# Patient Record
Sex: MALE | Race: ASIAN | Employment: FULL TIME | URBAN - METROPOLITAN AREA
[De-identification: names, ages, dates, MRNs, and addresses within clinical notes are randomized per-mention and may not be internally consistent; named-entity substitution may affect disease eponyms.]

---

## 2017-02-25 ENCOUNTER — HOSPITAL ENCOUNTER (EMERGENCY)
Facility: HOSPITAL | Age: 77
Discharge: HOME | End: 2017-02-25
Attending: EMERGENCY MEDICINE
Payer: MEDICARE

## 2017-02-25 ENCOUNTER — APPOINTMENT (OUTPATIENT)
Dept: CT IMAGING | Facility: HOSPITAL | Age: 77
End: 2017-02-25
Attending: EMERGENCY MEDICINE
Payer: MEDICARE

## 2017-02-25 VITALS
RESPIRATION RATE: 16 BRPM | OXYGEN SATURATION: 98 % | HEIGHT: 64 IN | WEIGHT: 148 LBS | DIASTOLIC BLOOD PRESSURE: 63 MMHG | SYSTOLIC BLOOD PRESSURE: 133 MMHG | TEMPERATURE: 97 F | HEART RATE: 81 BPM | BODY MASS INDEX: 25.27 KG/M2

## 2017-02-25 DIAGNOSIS — R42 VERTIGO: Primary | ICD-10-CM

## 2017-02-25 LAB
ATRIAL RATE: 70 BPM
P AXIS: 38 DEGREES
P-R INTERVAL: 138 MS
Q-T INTERVAL: 414 MS
QRS DURATION: 82 MS
QTC CALCULATION (BEZET): 447 MS
R AXIS: 15 DEGREES
T AXIS: 47 DEGREES
VENTRICULAR RATE: 70 BPM

## 2017-02-25 PROCEDURE — 93005 ELECTROCARDIOGRAM TRACING: CPT

## 2017-02-25 PROCEDURE — 99284 EMERGENCY DEPT VISIT MOD MDM: CPT

## 2017-02-25 PROCEDURE — 70450 CT HEAD/BRAIN W/O DYE: CPT

## 2017-02-25 PROCEDURE — 93010 ELECTROCARDIOGRAM REPORT: CPT

## 2017-02-25 RX ORDER — MECLIZINE HYDROCHLORIDE 25 MG/1
25 TABLET ORAL 3 TIMES DAILY PRN
Qty: 20 TABLET | Refills: 0 | Status: SHIPPED | OUTPATIENT
Start: 2017-02-25 | End: 2017-03-21

## 2017-02-25 RX ORDER — ONDANSETRON 4 MG/1
4 TABLET, ORALLY DISINTEGRATING ORAL ONCE
Status: COMPLETED | OUTPATIENT
Start: 2017-02-25 | End: 2017-02-25

## 2017-02-25 RX ORDER — GLIPIZIDE 10 MG/1
20 TABLET ORAL DAILY
COMMUNITY
End: 2017-09-18

## 2017-02-25 RX ORDER — MECLIZINE HYDROCHLORIDE 25 MG/1
25 TABLET ORAL ONCE
Status: COMPLETED | OUTPATIENT
Start: 2017-02-25 | End: 2017-02-25

## 2017-02-25 RX ORDER — ONDANSETRON 4 MG/1
4 TABLET, ORALLY DISINTEGRATING ORAL EVERY 4 HOURS PRN
Qty: 10 TABLET | Refills: 0 | Status: SHIPPED | OUTPATIENT
Start: 2017-02-25 | End: 2017-03-04

## 2017-02-25 NOTE — ED PROVIDER NOTES
Patient Seen in: [facility] Emergency Department    History   Patient presents with:  Dizziness (neurologic)    Stated Complaint: dizziness     HPI    81-year-old male with a history of insulin-dependent diabetes, hypertension, presents to the emerg Smokeless Status: Never Used                        Alcohol Use: No                Review of Systems    Positive for stated complaint: dizziness   Other systems are as noted in HPI. Constitutional and vital signs reviewed.       All other systems reviewed Ventricular rate of 70. No acute ST elevations or depressions. Rate, axis, intervals are noted. Agree with computer interpretation. Patient was brought back to the examination room immediately.   The patient was then placed on a cardiac monitor Zofran. Follow-up with his primary care physician. The family states he has been seen by ENT in the past for similar complaints and also for his tinnitus. Findings  of the patient's tests results were discussed with the patient in detail.   They were u

## 2017-03-07 ENCOUNTER — NURSE ONLY (OUTPATIENT)
Dept: ENDOCRINOLOGY CLINIC | Facility: CLINIC | Age: 77
End: 2017-03-07

## 2017-03-07 VITALS
DIASTOLIC BLOOD PRESSURE: 72 MMHG | HEART RATE: 76 BPM | SYSTOLIC BLOOD PRESSURE: 146 MMHG | WEIGHT: 148 LBS | BODY MASS INDEX: 25 KG/M2

## 2017-03-07 DIAGNOSIS — E11.65 TYPE 2 DIABETES MELLITUS WITH HYPERGLYCEMIA, WITHOUT LONG-TERM CURRENT USE OF INSULIN (HCC): Primary | ICD-10-CM

## 2017-03-07 PROCEDURE — G0108 DIAB MANAGE TRN  PER INDIV: HCPCS

## 2017-03-08 NOTE — PROGRESS NOTES
Cherri Prado  : 1940 was seen for Injection Instruction:    Date: 3/7/2017       /72 mmHg  Pulse 76  Wt 148 lb    Glucose today: 221  Teachings were made to PT.  Wife and Daughter    Medication type, dose and frequency: Levemir 20 units a

## 2017-03-21 ENCOUNTER — NURSE ONLY (OUTPATIENT)
Dept: ENDOCRINOLOGY CLINIC | Facility: CLINIC | Age: 77
End: 2017-03-21

## 2017-03-21 VITALS
DIASTOLIC BLOOD PRESSURE: 69 MMHG | WEIGHT: 157 LBS | BODY MASS INDEX: 27 KG/M2 | HEART RATE: 77 BPM | SYSTOLIC BLOOD PRESSURE: 127 MMHG

## 2017-03-21 DIAGNOSIS — E11.65 TYPE 2 DIABETES MELLITUS WITH HYPERGLYCEMIA, WITHOUT LONG-TERM CURRENT USE OF INSULIN (HCC): Primary | ICD-10-CM

## 2017-03-21 PROCEDURE — 99211 OFF/OP EST MAY X REQ PHY/QHP: CPT

## 2017-03-21 RX ORDER — INSULIN ASPART 100 [IU]/ML
5 INJECTION, SOLUTION INTRAVENOUS; SUBCUTANEOUS
COMMUNITY
End: 2017-12-11

## 2017-03-21 NOTE — PROGRESS NOTES
Met with Fuad Nolen for a 2 week follow up post insulin initiation. He is testing BG levels fasting and before lunch and dinner and at HS. Fasting BG levels are within acceptable ranges ranging from  with an average of 96.   Before lunch he is within ac

## 2017-06-16 ENCOUNTER — APPOINTMENT (OUTPATIENT)
Dept: CT IMAGING | Facility: HOSPITAL | Age: 77
End: 2017-06-16
Attending: EMERGENCY MEDICINE
Payer: MEDICARE

## 2017-06-16 ENCOUNTER — HOSPITAL ENCOUNTER (EMERGENCY)
Facility: HOSPITAL | Age: 77
Discharge: HOME | End: 2017-06-16
Attending: EMERGENCY MEDICINE
Payer: MEDICARE

## 2017-06-16 VITALS
OXYGEN SATURATION: 98 % | HEART RATE: 98 BPM | DIASTOLIC BLOOD PRESSURE: 71 MMHG | BODY MASS INDEX: 26.98 KG/M2 | WEIGHT: 158 LBS | SYSTOLIC BLOOD PRESSURE: 136 MMHG | TEMPERATURE: 98 F | HEIGHT: 64 IN | RESPIRATION RATE: 16 BRPM

## 2017-06-16 DIAGNOSIS — K57.92 ACUTE DIVERTICULITIS: Primary | ICD-10-CM

## 2017-06-16 DIAGNOSIS — E16.2 HYPOGLYCEMIA: ICD-10-CM

## 2017-06-16 PROCEDURE — 85025 COMPLETE CBC W/AUTO DIFF WBC: CPT | Performed by: EMERGENCY MEDICINE

## 2017-06-16 PROCEDURE — 93010 ELECTROCARDIOGRAM REPORT: CPT

## 2017-06-16 PROCEDURE — 99285 EMERGENCY DEPT VISIT HI MDM: CPT

## 2017-06-16 PROCEDURE — 96375 TX/PRO/DX INJ NEW DRUG ADDON: CPT

## 2017-06-16 PROCEDURE — 93005 ELECTROCARDIOGRAM TRACING: CPT

## 2017-06-16 PROCEDURE — 83690 ASSAY OF LIPASE: CPT | Performed by: EMERGENCY MEDICINE

## 2017-06-16 PROCEDURE — 80053 COMPREHEN METABOLIC PANEL: CPT | Performed by: EMERGENCY MEDICINE

## 2017-06-16 PROCEDURE — 74177 CT ABD & PELVIS W/CONTRAST: CPT | Performed by: EMERGENCY MEDICINE

## 2017-06-16 PROCEDURE — 96374 THER/PROPH/DIAG INJ IV PUSH: CPT

## 2017-06-16 PROCEDURE — 96361 HYDRATE IV INFUSION ADD-ON: CPT

## 2017-06-16 PROCEDURE — 82962 GLUCOSE BLOOD TEST: CPT

## 2017-06-16 RX ORDER — ATORVASTATIN CALCIUM 20 MG/1
20 TABLET, FILM COATED ORAL DAILY
COMMUNITY
End: 2018-07-09

## 2017-06-16 RX ORDER — DEXTROSE MONOHYDRATE 25 G/50ML
50 INJECTION, SOLUTION INTRAVENOUS ONCE
Status: COMPLETED | OUTPATIENT
Start: 2017-06-16 | End: 2017-06-16

## 2017-06-16 RX ORDER — SODIUM CHLORIDE 9 MG/ML
INJECTION, SOLUTION INTRAVENOUS ONCE
Status: COMPLETED | OUTPATIENT
Start: 2017-06-16 | End: 2017-06-16

## 2017-06-16 RX ORDER — ONDANSETRON 2 MG/ML
4 INJECTION INTRAMUSCULAR; INTRAVENOUS ONCE
Status: COMPLETED | OUTPATIENT
Start: 2017-06-16 | End: 2017-06-16

## 2017-06-16 RX ORDER — LOSARTAN POTASSIUM 50 MG/1
50 TABLET ORAL DAILY
COMMUNITY
End: 2018-07-09

## 2017-06-16 RX ORDER — LEVOFLOXACIN 500 MG/1
500 TABLET, FILM COATED ORAL DAILY
Qty: 10 TABLET | Refills: 0 | Status: SHIPPED | OUTPATIENT
Start: 2017-06-16 | End: 2017-06-26

## 2017-06-16 RX ORDER — METRONIDAZOLE 500 MG/1
500 TABLET ORAL 3 TIMES DAILY
Qty: 30 TABLET | Refills: 0 | Status: SHIPPED | OUTPATIENT
Start: 2017-06-16 | End: 2017-06-26

## 2017-06-16 RX ORDER — KETOROLAC TROMETHAMINE 30 MG/ML
30 INJECTION, SOLUTION INTRAMUSCULAR; INTRAVENOUS ONCE
Status: COMPLETED | OUTPATIENT
Start: 2017-06-16 | End: 2017-06-16

## 2017-06-16 NOTE — ED INITIAL ASSESSMENT (HPI)
Pt here with lower abdominal pain since yesterday, no fevers, vomiting or diarrhea.  Last BM this am.

## 2017-06-16 NOTE — ED PROVIDER NOTES
Patient Seen in: [facility] Emergency Department    History   Patient presents with:  Abdomen/Flank Pain (GI/)    Stated Complaint: lower abdominal pain- hx of diverticulitis    HPI    29-year-old male comes in the hospital she completed having di (LIPITOR) 10 MG Oral Tab,  1 TABLET DAILY       Family History   Problem Relation Age of Onset   • Other[other] [OTHER] Sister      BRAIN HEMMORRAGE         Smoking Status: Never Smoker                      Smokeless Status: Never Used Prelim 7.52 (*)     Neutrophil Absolute 7.52 (*)     Monocyte Absolute 1.09 (*)     All other components within normal limits   LIPASE - Normal   CBC WITH DIFFERENTIAL WITH PLATELET    Narrative:      The following orders were created for panel order CBC WI coronal MPR imaging was performed.  Dose reduction techniques were used.  Dose information is   transmitted to the [facility] ([facility]) [address]) which includes the Dose Index Registry.     PATIENT STATED HISTORY associated with a thick calcified pleural plaque along the diaphragmatic pleura on the right. Correlate for prior dust exposure/asbestos related disease.   OTHER:  Negative.       Medications   0.9%  NaCl infusion ( Intravenous New Bag 6/16/17 0570)   ondan

## 2017-06-29 ENCOUNTER — NURSE ONLY (OUTPATIENT)
Dept: ENDOCRINOLOGY CLINIC | Facility: CLINIC | Age: 77
End: 2017-06-29

## 2017-06-29 VITALS
DIASTOLIC BLOOD PRESSURE: 74 MMHG | HEART RATE: 82 BPM | SYSTOLIC BLOOD PRESSURE: 128 MMHG | WEIGHT: 158 LBS | BODY MASS INDEX: 27 KG/M2

## 2017-06-29 DIAGNOSIS — E11.65 TYPE 2 DIABETES MELLITUS WITH HYPERGLYCEMIA, WITHOUT LONG-TERM CURRENT USE OF INSULIN (HCC): Primary | ICD-10-CM

## 2017-06-29 PROCEDURE — G0108 DIAB MANAGE TRN  PER INDIV: HCPCS

## 2017-06-29 NOTE — PROGRESS NOTES
Ade Mccain  : 1940 was seen for Diabetic Education Follow up:    Date: 2017       Assessment:     Assessment: /74   Pulse 82   Wt 158 lb   BMI 27.12 kg/m²      No results found for: A1C     Depression Screen -  0    Diagnosis: Typ 142 136 - 144 mmol/L   Potassium 3.7 3.6 - 5.1 mmol/L   Chloride 109 101 - 111 mmol/L   CO2 28.0 22.0 - 32.0 mmol/L   -LIPASE   Result Value Ref Range   Lipase 190 73 - 393 U/L   -EKG 12-LEAD   Result Value Ref Range   Ventricular rate 74 BPM   Atrial rate low saturated fat, high fiber, reduced sodium)    Being Active  Benefits and effect of activity on BG discussed. Reviewed when to call MD and benefits of goal setting.       Monitoring  Reinforced glucose monitoring schedules: 4 times a day before meals an

## 2017-08-31 ENCOUNTER — HOSPITAL ENCOUNTER (OUTPATIENT)
Facility: HOSPITAL | Age: 77
Setting detail: OBSERVATION
Discharge: HOME | End: 2017-09-01
Attending: EMERGENCY MEDICINE | Admitting: INTERNAL MEDICINE
Payer: MEDICARE

## 2017-08-31 ENCOUNTER — ANESTHESIA EVENT (OUTPATIENT)
Dept: ENDOSCOPY | Facility: HOSPITAL | Age: 77
End: 2017-08-31
Payer: MEDICARE

## 2017-08-31 DIAGNOSIS — K92.1 BLOODY STOOLS: ICD-10-CM

## 2017-08-31 DIAGNOSIS — K92.2 GASTROINTESTINAL HEMORRHAGE, UNSPECIFIED GASTROINTESTINAL HEMORRHAGE TYPE: Primary | ICD-10-CM

## 2017-08-31 LAB
ALBUMIN SERPL-MCNC: 3.4 G/DL (ref 3.5–4.8)
ALP LIVER SERPL-CCNC: 80 U/L (ref 45–117)
ALT SERPL-CCNC: 38 U/L (ref 17–63)
ANTIBODY SCREEN: NEGATIVE
APTT PPP: 32 SECONDS (ref 25–34)
AST SERPL-CCNC: 23 U/L (ref 15–41)
ATRIAL RATE: 75 BPM
BASOPHILS # BLD AUTO: 0.05 X10(3) UL (ref 0–0.1)
BASOPHILS NFR BLD AUTO: 0.7 %
BILIRUB SERPL-MCNC: 0.6 MG/DL (ref 0.1–2)
BUN BLD-MCNC: 21 MG/DL (ref 8–20)
CALCIUM BLD-MCNC: 9.1 MG/DL (ref 8.3–10.3)
CHLORIDE: 109 MMOL/L (ref 101–111)
CO2: 24 MMOL/L (ref 22–32)
CREAT BLD-MCNC: 1.43 MG/DL (ref 0.7–1.3)
EOSINOPHIL # BLD AUTO: 0.17 X10(3) UL (ref 0–0.3)
EOSINOPHIL NFR BLD AUTO: 2.5 %
ERYTHROCYTE [DISTWIDTH] IN BLOOD BY AUTOMATED COUNT: 12.6 % (ref 11.5–16)
GLUCOSE BLD-MCNC: 110 MG/DL (ref 65–99)
GLUCOSE BLD-MCNC: 205 MG/DL (ref 70–99)
GLUCOSE BLD-MCNC: 77 MG/DL (ref 65–99)
GLUCOSE BLD-MCNC: 93 MG/DL (ref 65–99)
HCT VFR BLD AUTO: 44.6 % (ref 37–53)
HGB BLD-MCNC: 14.5 G/DL (ref 13–17)
IMMATURE GRANULOCYTE COUNT: 0.01 X10(3) UL (ref 0–1)
IMMATURE GRANULOCYTE RATIO %: 0.1 %
INR BLD: 1.06 (ref 0.89–1.11)
LYMPHOCYTES # BLD AUTO: 2.04 X10(3) UL (ref 0.9–4)
LYMPHOCYTES NFR BLD AUTO: 29.7 %
M PROTEIN MFR SERPL ELPH: 7.2 G/DL (ref 6.1–8.3)
MCH RBC QN AUTO: 30.1 PG (ref 27–33.2)
MCHC RBC AUTO-ENTMCNC: 32.5 G/DL (ref 31–37)
MCV RBC AUTO: 92.5 FL (ref 80–99)
MONOCYTES # BLD AUTO: 0.49 X10(3) UL (ref 0.1–0.6)
MONOCYTES NFR BLD AUTO: 7.1 %
NEUTROPHIL ABS PRELIM: 4.11 X10 (3) UL (ref 1.3–6.7)
NEUTROPHILS # BLD AUTO: 4.11 X10(3) UL (ref 1.3–6.7)
NEUTROPHILS NFR BLD AUTO: 59.9 %
P AXIS: 33 DEGREES
P-R INTERVAL: 152 MS
PLATELET # BLD AUTO: 290 10(3)UL (ref 150–450)
POTASSIUM SERPL-SCNC: 4.3 MMOL/L (ref 3.6–5.1)
PSA SERPL DL<=0.01 NG/ML-MCNC: 13.8 SECONDS (ref 12–14.3)
Q-T INTERVAL: 372 MS
QRS DURATION: 82 MS
QTC CALCULATION (BEZET): 415 MS
R AXIS: 11 DEGREES
RBC # BLD AUTO: 4.82 X10(6)UL (ref 3.8–5.8)
RED CELL DISTRIBUTION WIDTH-SD: 42.9 FL (ref 35.1–46.3)
RH BLOOD TYPE: POSITIVE
SODIUM SERPL-SCNC: 140 MMOL/L (ref 136–144)
T AXIS: 46 DEGREES
VENTRICULAR RATE: 75 BPM
WBC # BLD AUTO: 6.9 X10(3) UL (ref 4–13)

## 2017-08-31 PROCEDURE — 85730 THROMBOPLASTIN TIME PARTIAL: CPT | Performed by: EMERGENCY MEDICINE

## 2017-08-31 PROCEDURE — 99285 EMERGENCY DEPT VISIT HI MDM: CPT

## 2017-08-31 PROCEDURE — 96360 HYDRATION IV INFUSION INIT: CPT

## 2017-08-31 PROCEDURE — 80053 COMPREHEN METABOLIC PANEL: CPT | Performed by: EMERGENCY MEDICINE

## 2017-08-31 PROCEDURE — 82962 GLUCOSE BLOOD TEST: CPT

## 2017-08-31 PROCEDURE — 86900 BLOOD TYPING SEROLOGIC ABO: CPT | Performed by: EMERGENCY MEDICINE

## 2017-08-31 PROCEDURE — 86901 BLOOD TYPING SEROLOGIC RH(D): CPT | Performed by: EMERGENCY MEDICINE

## 2017-08-31 PROCEDURE — 85025 COMPLETE CBC W/AUTO DIFF WBC: CPT | Performed by: EMERGENCY MEDICINE

## 2017-08-31 PROCEDURE — 86850 RBC ANTIBODY SCREEN: CPT | Performed by: EMERGENCY MEDICINE

## 2017-08-31 PROCEDURE — 93005 ELECTROCARDIOGRAM TRACING: CPT

## 2017-08-31 PROCEDURE — 93010 ELECTROCARDIOGRAM REPORT: CPT

## 2017-08-31 PROCEDURE — 85610 PROTHROMBIN TIME: CPT | Performed by: EMERGENCY MEDICINE

## 2017-08-31 RX ORDER — LOSARTAN POTASSIUM 50 MG/1
50 TABLET ORAL DAILY
Status: DISCONTINUED | OUTPATIENT
Start: 2017-08-31 | End: 2017-09-01

## 2017-08-31 RX ORDER — SODIUM CHLORIDE 9 MG/ML
1000 INJECTION, SOLUTION INTRAVENOUS ONCE
Status: DISCONTINUED | OUTPATIENT
Start: 2017-08-31 | End: 2017-08-31

## 2017-08-31 RX ORDER — SODIUM CHLORIDE 9 MG/ML
1000 INJECTION, SOLUTION INTRAVENOUS ONCE
Status: COMPLETED | OUTPATIENT
Start: 2017-08-31 | End: 2017-08-31

## 2017-08-31 RX ORDER — SODIUM CHLORIDE 450 MG/100ML
INJECTION, SOLUTION INTRAVENOUS CONTINUOUS
Status: DISCONTINUED | OUTPATIENT
Start: 2017-08-31 | End: 2017-09-01

## 2017-08-31 RX ORDER — SODIUM CHLORIDE 9 MG/ML
INJECTION, SOLUTION INTRAVENOUS CONTINUOUS
Status: ACTIVE | OUTPATIENT
Start: 2017-08-31 | End: 2017-08-31

## 2017-08-31 RX ORDER — AMLODIPINE BESYLATE 2.5 MG/1
2.5 TABLET ORAL
Status: DISCONTINUED | OUTPATIENT
Start: 2017-08-31 | End: 2017-09-01

## 2017-08-31 RX ORDER — ACETAMINOPHEN 160 MG
2000 TABLET,DISINTEGRATING ORAL DAILY
COMMUNITY

## 2017-08-31 RX ORDER — DEXTROSE MONOHYDRATE 25 G/50ML
50 INJECTION, SOLUTION INTRAVENOUS
Status: DISCONTINUED | OUTPATIENT
Start: 2017-08-31 | End: 2017-09-01

## 2017-08-31 RX ORDER — GARLIC EXTRACT 500 MG
1 CAPSULE ORAL DAILY
COMMUNITY
End: 2018-02-23 | Stop reason: ALTCHOICE

## 2017-08-31 NOTE — ED PROVIDER NOTES
Patient Seen in: [facility] Emergency Department    History   Patient presents with:  GI Bleeding (gastrointestinal)    Stated Complaint: GI BLEED    HPI    60-year-old male comes in the hospital with a complaint of having bright red blood in his st complaint: GI BLEED  Other systems are as noted in HPI. Constitutional and vital signs reviewed. All other systems reviewed and negative except as noted above. PSFH elements reviewed from today and agreed except as otherwise stated in HPI.     Phys ---------                               -----------         ------                     CBC W/ DIFFERENTIAL[[id number]]          Normal              Final result                 Please view results for these tests on the individual orders.    TYPE AND SCREE

## 2017-08-31 NOTE — ANESTHESIA PREPROCEDURE EVALUATION
PRE-OP EVALUATION    Patient Name: Sb Cheng    Pre-op Diagnosis: Bloody stools [K92.1]    Procedure(s):  COLONOSCOPY    Surgeon(s) and Role:     * Madi Gonzalez,  - Eboni    Pre-op vitals reviewed.   Temp: 97.7 °F (36.5 °C)  Pulse: 58  Resp OTHER SURGICAL HISTORY      Comment: SPAM/HAD AGIOGRAM & RELAPSED     Smoking status: Never Smoker    Smokeless tobacco: Never Used    Alcohol use No       Drug use: No     Available pre-op labs reviewed.     Lab Results  Component Value Date   WBC 6.9 08/3

## 2017-08-31 NOTE — H&P
[address]    PATIENT'S NAME: Jessy Pena   ATTENDING PHYSICIAN: Abdoulaye Dunn M.D.    PATIENT ACCOUNT#:   [de-identified]    LOCATION:  [address] [place] [address]  MEDICAL RECORD #:   [id number]       YOB: 1940  ADMISSION DATE:       08/31/2017

## 2017-08-31 NOTE — ED INITIAL ASSESSMENT (HPI)
Pt complains of blood in stool yesterday and today. It is bright red. It occurred twice yesterday and once today with bowel movements. Pt is not on anticoagulants.  He has been diagnosed with hemorrhoids in the past.

## 2017-08-31 NOTE — CONSULTS
[facility]                       Gastroenterology [address] [facility] Gastroenterology    [facility]:  [facility]    1940 MRN [id number]   Location [address] [facility] [address] Attending Mimi Collado, [address] [facility] facility-administered medications on file as of 8/31/2017. Allergies: No Known Allergies  SocHx:  No history of smoking; The patient denies alcohol intake; The patient has no history of IV drug use or other illicit substances.   FamHx: The patient has no S1 and S2  Resp: Clear to auscultation bilaterally without wheezes; rubs, rhonchi, or rales  Abdomen: Soft, non-tender, non-distended with the presence of bowel sounds; No hepatosplenomegaly; no rebound or guarding;  No ascites is clinically apparent; no ty Zofran 4 mg IV q 6 PRN nausea  Thank you for the consultation, we will follow the patient with you.   AUBREE Loo  10:19 AM  8/31/2017  [facility] Gastroenterology  [phone]    Attending physician addendum:   I personally saw and examined the natalie

## 2017-09-01 ENCOUNTER — ANESTHESIA (OUTPATIENT)
Dept: ENDOSCOPY | Facility: HOSPITAL | Age: 77
End: 2017-09-01
Payer: MEDICARE

## 2017-09-01 ENCOUNTER — SURGERY (OUTPATIENT)
Age: 77
End: 2017-09-01

## 2017-09-01 VITALS
OXYGEN SATURATION: 97 % | HEART RATE: 65 BPM | TEMPERATURE: 98 F | RESPIRATION RATE: 18 BRPM | HEIGHT: 64 IN | BODY MASS INDEX: 26.98 KG/M2 | DIASTOLIC BLOOD PRESSURE: 56 MMHG | WEIGHT: 158 LBS | SYSTOLIC BLOOD PRESSURE: 139 MMHG

## 2017-09-01 LAB
BASOPHILS # BLD AUTO: 0.07 X10(3) UL (ref 0–0.1)
BASOPHILS NFR BLD AUTO: 0.9 %
BUN BLD-MCNC: 14 MG/DL (ref 8–20)
CALCIUM BLD-MCNC: 8.8 MG/DL (ref 8.3–10.3)
CHLORIDE: 111 MMOL/L (ref 101–111)
CO2: 25 MMOL/L (ref 22–32)
CREAT BLD-MCNC: 1.2 MG/DL (ref 0.7–1.3)
EOSINOPHIL # BLD AUTO: 0.24 X10(3) UL (ref 0–0.3)
EOSINOPHIL NFR BLD AUTO: 3.2 %
ERYTHROCYTE [DISTWIDTH] IN BLOOD BY AUTOMATED COUNT: 12.5 % (ref 11.5–16)
GLUCOSE BLD-MCNC: 80 MG/DL (ref 65–99)
GLUCOSE BLD-MCNC: 81 MG/DL (ref 70–99)
HAV IGM SER QL: 2.2 MG/DL (ref 1.7–3)
HCT VFR BLD AUTO: 41.4 % (ref 37–53)
HGB BLD-MCNC: 13.9 G/DL (ref 13–17)
IMMATURE GRANULOCYTE COUNT: 0.01 X10(3) UL (ref 0–1)
IMMATURE GRANULOCYTE RATIO %: 0.1 %
LYMPHOCYTES # BLD AUTO: 2.65 X10(3) UL (ref 0.9–4)
LYMPHOCYTES NFR BLD AUTO: 35.1 %
MCH RBC QN AUTO: 31 PG (ref 27–33.2)
MCHC RBC AUTO-ENTMCNC: 33.6 G/DL (ref 31–37)
MCV RBC AUTO: 92.2 FL (ref 80–99)
MONOCYTES # BLD AUTO: 0.73 X10(3) UL (ref 0.1–0.6)
MONOCYTES NFR BLD AUTO: 9.7 %
NEUTROPHIL ABS PRELIM: 3.84 X10 (3) UL (ref 1.3–6.7)
NEUTROPHILS # BLD AUTO: 3.84 X10(3) UL (ref 1.3–6.7)
NEUTROPHILS NFR BLD AUTO: 51 %
PLATELET # BLD AUTO: 275 10(3)UL (ref 150–450)
POTASSIUM SERPL-SCNC: 4.1 MMOL/L (ref 3.6–5.1)
RBC # BLD AUTO: 4.49 X10(6)UL (ref 3.8–5.8)
RED CELL DISTRIBUTION WIDTH-SD: 42.4 FL (ref 35.1–46.3)
SODIUM SERPL-SCNC: 142 MMOL/L (ref 136–144)
WBC # BLD AUTO: 7.5 X10(3) UL (ref 4–13)

## 2017-09-01 PROCEDURE — 0DBK8ZZ EXCISION OF ASCENDING COLON, VIA NATURAL OR ARTIFICIAL OPENING ENDOSCOPIC: ICD-10-PCS | Performed by: INTERNAL MEDICINE

## 2017-09-01 PROCEDURE — 88305 TISSUE EXAM BY PATHOLOGIST: CPT | Performed by: INTERNAL MEDICINE

## 2017-09-01 PROCEDURE — 85025 COMPLETE CBC W/AUTO DIFF WBC: CPT | Performed by: NURSE PRACTITIONER

## 2017-09-01 PROCEDURE — 80048 BASIC METABOLIC PNL TOTAL CA: CPT | Performed by: NURSE PRACTITIONER

## 2017-09-01 PROCEDURE — 82962 GLUCOSE BLOOD TEST: CPT

## 2017-09-01 PROCEDURE — 83735 ASSAY OF MAGNESIUM: CPT | Performed by: NURSE PRACTITIONER

## 2017-09-01 RX ORDER — NALOXONE HYDROCHLORIDE 0.4 MG/ML
80 INJECTION, SOLUTION INTRAMUSCULAR; INTRAVENOUS; SUBCUTANEOUS AS NEEDED
Status: CANCELLED | OUTPATIENT
Start: 2017-09-01 | End: 2017-09-01

## 2017-09-01 RX ORDER — SODIUM CHLORIDE, SODIUM LACTATE, POTASSIUM CHLORIDE, CALCIUM CHLORIDE 600; 310; 30; 20 MG/100ML; MG/100ML; MG/100ML; MG/100ML
INJECTION, SOLUTION INTRAVENOUS CONTINUOUS
Status: CANCELLED | OUTPATIENT
Start: 2017-09-01

## 2017-09-01 RX ORDER — ONDANSETRON 2 MG/ML
4 INJECTION INTRAMUSCULAR; INTRAVENOUS AS NEEDED
Status: CANCELLED | OUTPATIENT
Start: 2017-09-01 | End: 2017-09-01

## 2017-09-01 RX ORDER — DIPHENHYDRAMINE HYDROCHLORIDE 50 MG/ML
12.5 INJECTION INTRAMUSCULAR; INTRAVENOUS AS NEEDED
Status: CANCELLED | OUTPATIENT
Start: 2017-09-01 | End: 2017-09-01

## 2017-09-01 RX ORDER — DEXTROSE MONOHYDRATE 25 G/50ML
50 INJECTION, SOLUTION INTRAVENOUS
Status: CANCELLED | OUTPATIENT
Start: 2017-09-01

## 2017-09-01 NOTE — H&P (VIEW-ONLY)
[facility]                       Gastroenterology [address] [facility] Gastroenterology    Leidy Hooker Patient Status:  Emergency    1940 MRN [id number]   Location [address] [facility] Attending Claire Iraheta, [address] [place] [address] facility-administered medications on file as of 8/31/2017. Allergies: No Known Allergies  SocHx:  No history of smoking; The patient denies alcohol intake; The patient has no history of IV drug use or other illicit substances.   FamHx: The patient has no S1 and S2  Resp: Clear to auscultation bilaterally without wheezes; rubs, rhonchi, or rales  Abdomen: Soft, non-tender, non-distended with the presence of bowel sounds; No hepatosplenomegaly; no rebound or guarding;  No ascites is clinically apparent; no ty Zofran 4 mg IV q 6 PRN nausea  Thank you for the consultation, we will follow the patient with you.   AUBREE Landaverde  10:19 AM  8/31/2017  [facility]  [phone]    Attending physician addendum:   I personally saw and examined the natalie

## 2017-09-01 NOTE — PLAN OF CARE
GASTROINTESTINAL - ADULT    • Maintains or returns to baseline bowel function Progressing          Admitted from ED via cart. Ambulated to bed. AAOx4. VSS. Admission database completed. C/O BRBPR. Clear liquid diet prescribed.   GoLytely prep started

## 2017-09-01 NOTE — PLAN OF CARE
Pt is alert and oriented x 4. Vitals stable. No C/o pain or discomfort. No Bleeding seen since this morning. Stable after procedure. Started on Soft diet. Paged  for Discharge orders. Will continue to monitor.

## 2017-09-01 NOTE — ANESTHESIA POSTPROCEDURE EVALUATION
[address] [facility] Patient Status:  Observation   Age/Gender 69year old male MRN [id number]   Location [address] [facility]. Attending Counts [facility] [address] Day # 0 PCP Ayanna Thorne MD       Anesthesia Post-op

## 2017-09-01 NOTE — OPERATIVE REPORT
COLONOSCOPY WITH BIOPSY      Lashanda Mcnamara Patient Status:  Observation    1940 MRN [id number]   [facility] ENDOSCOPY Attending Margi Flores [address] Day # 0 PCP Jacque Alex MD       PREOPERATIVE D colon: Three sessile 3 mm polyps s/p removal using cold biopsy forceps. Transverse colon: The mucosa and vascular pattern were normal.  Descending colon: The mucosa and vascular pattern were normal.  Sigmoid colon: Multiple diverticula. Rectum:  The muc

## 2017-09-01 NOTE — INTERVAL H&P NOTE
Pre-op Diagnosis: Bloody stools [K92.1]    The above referenced H&P was reviewed by Elton Melendrez DO on 9/1/2017, the patient was examined and no significant changes have occurred in the patient's condition since the H&P was performed.   I discussed with

## 2017-09-01 NOTE — PLAN OF CARE
Diabetes/Glucose Control    • Glucose maintained within prescribed range Progressing        GASTROINTESTINAL - ADULT    • Maintains or returns to baseline bowel function Progressing    • Minimal or absence of nausea and vomiting Progressing        PAIN - A

## 2017-09-01 NOTE — PLAN OF CARE
NURSING DISCHARGE NOTE    Discharged Home via Wheelchair. Accompanied by Spouse and Support staff  Belongings Taken by patient/family.

## 2017-10-17 PROBLEM — Z79.4 TYPE 2 DIABETES MELLITUS WITHOUT COMPLICATION, WITH LONG-TERM CURRENT USE OF INSULIN (HCC): Status: ACTIVE | Noted: 2017-10-17

## 2017-10-17 PROBLEM — E11.9 TYPE 2 DIABETES MELLITUS WITHOUT COMPLICATION, WITH LONG-TERM CURRENT USE OF INSULIN (HCC): Status: ACTIVE | Noted: 2017-10-17

## 2017-10-17 PROBLEM — E78.00 HYPERCHOLESTEREMIA: Status: ACTIVE | Noted: 2017-10-17

## 2018-02-23 PROCEDURE — 82043 UR ALBUMIN QUANTITATIVE: CPT | Performed by: INTERNAL MEDICINE

## 2018-02-23 PROCEDURE — 82570 ASSAY OF URINE CREATININE: CPT | Performed by: INTERNAL MEDICINE

## 2019-12-23 ENCOUNTER — APPOINTMENT (OUTPATIENT)
Dept: CT IMAGING | Facility: HOSPITAL | Age: 79
End: 2019-12-23
Attending: EMERGENCY MEDICINE
Payer: MEDICARE

## 2019-12-23 ENCOUNTER — APPOINTMENT (OUTPATIENT)
Dept: GENERAL RADIOLOGY | Facility: HOSPITAL | Age: 79
End: 2019-12-23
Attending: EMERGENCY MEDICINE
Payer: MEDICARE

## 2019-12-23 ENCOUNTER — HOSPITAL ENCOUNTER (EMERGENCY)
Facility: HOSPITAL | Age: 79
Discharge: HOME | End: 2019-12-23
Attending: EMERGENCY MEDICINE
Payer: MEDICARE

## 2019-12-23 VITALS
OXYGEN SATURATION: 98 % | RESPIRATION RATE: 18 BRPM | BODY MASS INDEX: 26.46 KG/M2 | HEIGHT: 64 IN | HEART RATE: 72 BPM | SYSTOLIC BLOOD PRESSURE: 138 MMHG | TEMPERATURE: 98 F | DIASTOLIC BLOOD PRESSURE: 68 MMHG | WEIGHT: 155 LBS

## 2019-12-23 DIAGNOSIS — K64.8 INTERNAL HEMORRHOIDS: ICD-10-CM

## 2019-12-23 DIAGNOSIS — K59.00 CONSTIPATION, UNSPECIFIED CONSTIPATION TYPE: Primary | ICD-10-CM

## 2019-12-23 PROCEDURE — 74018 RADEX ABDOMEN 1 VIEW: CPT | Performed by: EMERGENCY MEDICINE

## 2019-12-23 PROCEDURE — 85025 COMPLETE CBC W/AUTO DIFF WBC: CPT | Performed by: EMERGENCY MEDICINE

## 2019-12-23 PROCEDURE — 36415 COLL VENOUS BLD VENIPUNCTURE: CPT

## 2019-12-23 PROCEDURE — 74176 CT ABD & PELVIS W/O CONTRAST: CPT | Performed by: EMERGENCY MEDICINE

## 2019-12-23 PROCEDURE — 99284 EMERGENCY DEPT VISIT MOD MDM: CPT

## 2019-12-23 PROCEDURE — 82962 GLUCOSE BLOOD TEST: CPT

## 2019-12-23 RX ORDER — BISACODYL 10 MG
10 SUPPOSITORY, RECTAL RECTAL
Status: DISCONTINUED | OUTPATIENT
Start: 2019-12-23 | End: 2019-12-23

## 2019-12-23 RX ORDER — POLYETHYLENE GLYCOL 3350 17 G/17G
17 POWDER, FOR SOLUTION ORAL DAILY PRN
Qty: 12 EACH | Refills: 0 | Status: SHIPPED | OUTPATIENT
Start: 2019-12-23 | End: 2020-01-22

## 2019-12-23 NOTE — ED INITIAL ASSESSMENT (HPI)
Pt to ED with feeling constipated. Pt diagnosed with diverticulitis, started on levaquin and tramadol. Pt used suppository today, no relief. Pt c/o rectal pain.

## 2019-12-24 NOTE — ED PROVIDER NOTES
Patient Seen in: [facility] Emergency Department      History   Patient presents with:  Constipation    Stated Complaint: CONSTIPATION    HPI    32-year-old male presents to the emergency department with constipation and severe rectal pain.   Patient not in acute distress. Appearance: He is well-developed. HENT:      Head: Normocephalic and atraumatic. Neck:      Musculoskeletal: Normal range of motion and neck supple. Cardiovascular:      Rate and Rhythm: Normal rate and regular rhythm. Salima Massey MD on 12/23/2019 at 18:59          Ct Abdomen+pelvis(cpt=74176)    Result Date: 12/23/2019  CONCLUSION:  1.  The rectum is distended with stool with mild rectal wall thickening and mild induration of the perirectal fat, suggestive of proctitis

## 2020-05-14 ENCOUNTER — APPOINTMENT (OUTPATIENT)
Dept: GENERAL RADIOLOGY | Facility: HOSPITAL | Age: 80
DRG: 300 | End: 2020-05-14
Attending: EMERGENCY MEDICINE
Payer: MEDICARE

## 2020-05-14 ENCOUNTER — APPOINTMENT (OUTPATIENT)
Dept: MRI IMAGING | Facility: HOSPITAL | Age: 80
DRG: 300 | End: 2020-05-14
Attending: EMERGENCY MEDICINE
Payer: MEDICARE

## 2020-05-14 ENCOUNTER — HOSPITAL ENCOUNTER (INPATIENT)
Facility: HOSPITAL | Age: 80
LOS: 7 days | Discharge: HOME | DRG: 300 | End: 2020-05-21
Attending: EMERGENCY MEDICINE | Admitting: INTERNAL MEDICINE
Payer: MEDICARE

## 2020-05-14 ENCOUNTER — APPOINTMENT (OUTPATIENT)
Dept: ULTRASOUND IMAGING | Facility: HOSPITAL | Age: 80
DRG: 300 | End: 2020-05-14
Attending: EMERGENCY MEDICINE
Payer: MEDICARE

## 2020-05-14 ENCOUNTER — APPOINTMENT (OUTPATIENT)
Dept: CT IMAGING | Facility: HOSPITAL | Age: 80
DRG: 300 | End: 2020-05-14
Attending: EMERGENCY MEDICINE
Payer: MEDICARE

## 2020-05-14 DIAGNOSIS — J94.8 PLEURAL MASS: ICD-10-CM

## 2020-05-14 DIAGNOSIS — R50.9 FEVER, UNSPECIFIED FEVER CAUSE: Primary | ICD-10-CM

## 2020-05-14 DIAGNOSIS — D72.829 LEUKOCYTOSIS, UNSPECIFIED TYPE: ICD-10-CM

## 2020-05-14 DIAGNOSIS — M54.32 SCIATICA OF LEFT SIDE: ICD-10-CM

## 2020-05-14 PROBLEM — E11.65 TYPE 2 DIABETES MELLITUS WITH HYPERGLYCEMIA, WITHOUT LONG-TERM CURRENT USE OF INSULIN (HCC): Status: ACTIVE | Noted: 2017-10-17

## 2020-05-14 PROCEDURE — 96366 THER/PROPH/DIAG IV INF ADDON: CPT

## 2020-05-14 PROCEDURE — 81003 URINALYSIS AUTO W/O SCOPE: CPT | Performed by: EMERGENCY MEDICINE

## 2020-05-14 PROCEDURE — 85025 COMPLETE CBC W/AUTO DIFF WBC: CPT | Performed by: EMERGENCY MEDICINE

## 2020-05-14 PROCEDURE — 99285 EMERGENCY DEPT VISIT HI MDM: CPT

## 2020-05-14 PROCEDURE — 82962 GLUCOSE BLOOD TEST: CPT

## 2020-05-14 PROCEDURE — 93975 VASCULAR STUDY: CPT | Performed by: EMERGENCY MEDICINE

## 2020-05-14 PROCEDURE — 72158 MRI LUMBAR SPINE W/O & W/DYE: CPT | Performed by: EMERGENCY MEDICINE

## 2020-05-14 PROCEDURE — 74177 CT ABD & PELVIS W/CONTRAST: CPT | Performed by: EMERGENCY MEDICINE

## 2020-05-14 PROCEDURE — 71045 X-RAY EXAM CHEST 1 VIEW: CPT | Performed by: EMERGENCY MEDICINE

## 2020-05-14 PROCEDURE — A9575 INJ GADOTERATE MEGLUMI 0.1ML: HCPCS | Performed by: EMERGENCY MEDICINE

## 2020-05-14 PROCEDURE — 96365 THER/PROPH/DIAG IV INF INIT: CPT

## 2020-05-14 PROCEDURE — 80053 COMPREHEN METABOLIC PANEL: CPT | Performed by: EMERGENCY MEDICINE

## 2020-05-14 PROCEDURE — 76870 US EXAM SCROTUM: CPT | Performed by: EMERGENCY MEDICINE

## 2020-05-14 RX ORDER — MORPHINE SULFATE 4 MG/ML
4 INJECTION, SOLUTION INTRAMUSCULAR; INTRAVENOUS EVERY 2 HOUR PRN
Status: DISCONTINUED | OUTPATIENT
Start: 2020-05-14 | End: 2020-05-21

## 2020-05-14 RX ORDER — AMLODIPINE BESYLATE 2.5 MG/1
2.5 TABLET ORAL DAILY
Status: DISCONTINUED | OUTPATIENT
Start: 2020-05-15 | End: 2020-05-21

## 2020-05-14 RX ORDER — LEVOFLOXACIN 5 MG/ML
750 INJECTION, SOLUTION INTRAVENOUS ONCE
Status: COMPLETED | OUTPATIENT
Start: 2020-05-14 | End: 2020-05-14

## 2020-05-14 RX ORDER — ATORVASTATIN CALCIUM 20 MG/1
20 TABLET, FILM COATED ORAL NIGHTLY
Status: DISCONTINUED | OUTPATIENT
Start: 2020-05-14 | End: 2020-05-21

## 2020-05-14 RX ORDER — SODIUM CHLORIDE 9 MG/ML
INJECTION, SOLUTION INTRAVENOUS CONTINUOUS
Status: DISCONTINUED | OUTPATIENT
Start: 2020-05-14 | End: 2020-05-17

## 2020-05-14 RX ORDER — MORPHINE SULFATE 4 MG/ML
1 INJECTION, SOLUTION INTRAMUSCULAR; INTRAVENOUS EVERY 2 HOUR PRN
Status: DISCONTINUED | OUTPATIENT
Start: 2020-05-14 | End: 2020-05-21

## 2020-05-14 RX ORDER — DEXTROSE MONOHYDRATE 25 G/50ML
50 INJECTION, SOLUTION INTRAVENOUS
Status: DISCONTINUED | OUTPATIENT
Start: 2020-05-14 | End: 2020-05-21

## 2020-05-14 RX ORDER — ONDANSETRON 2 MG/ML
4 INJECTION INTRAMUSCULAR; INTRAVENOUS EVERY 6 HOURS PRN
Status: DISCONTINUED | OUTPATIENT
Start: 2020-05-14 | End: 2020-05-21

## 2020-05-14 RX ORDER — MORPHINE SULFATE 4 MG/ML
2 INJECTION, SOLUTION INTRAMUSCULAR; INTRAVENOUS EVERY 2 HOUR PRN
Status: DISCONTINUED | OUTPATIENT
Start: 2020-05-14 | End: 2020-05-21

## 2020-05-14 NOTE — PROGRESS NOTES
[facility] Pharmacy Note:  Renal Adjustment for levofloxacin (Winifred Lente)    Selvin Rosado is a 79year old male who has been prescribed levofloxacin (LEVAQUIN) 500 mg x1.   CrCl is estimated creatinine clearance is 30.2 mL/min (A) (based on SCr of 1.66 mg/dL (

## 2020-05-14 NOTE — ED INITIAL ASSESSMENT (HPI)
José Miguel states has bilateral sciatica pain x 6-7 days, pain to the back of both legs. José Miguel states had lab work done today and was told by his PMD to come to ER for abnormal labs.

## 2020-05-14 NOTE — ED PROVIDER NOTES
Patient Seen in: [facility] Emergency Department      History   Patient presents with:  Fever    Stated Complaint: fever, increased WBC's ~ MD Meryle Dykes wants patient admitted for possible COVID.  *    HPI    The patient is a 67-year-old male instructe 9/1/2017    Performed by Sixto Cabral DO at [facility] ENDOSCOPY   • HERNIA SURGERY     • OTHER SURGICAL HISTORY  1994    SPAM/HAD AGIOGRAM & RELAPSED                    Social History    Tobacco Use      Smoking status: Never Smoker      Smokeless tobacco: N but no overlying skin changes. Compartments are soft. Genitourinary: Normal external male genitalia on inspection, without any discharge at the meatus or lesions. No testicular or epididymal tenderness, but his testicles do seem mildly enlarged.    Extre Please view results for these tests on the individual orders.    URINALYSIS WITH CULTURE REFLEX   SCAN SLIDE   RAINBOW DRAW BLUE   RAINBOW DRAW LAVENDER   RAINBOW DRAW LIGHT GREEN   RAINBOW DRAW GOLD          Blood was obtained and peripheral IV access facet     arthropathy. There is no significant spinal canal or neural foraminal     stenosis. L1-2:  Diffuse disc bulge with endplate osteophytes and mild facet     arthropathy. There is no significant spinal canal or neural foraminal     stenosis. TECHNIQUE:  CT scanning was performed from the dome of the diaphragm to     the pubic symphysis with non-ionic intravenous contrast material. Post     contrast coronal MPR imaging was performed. Dose reduction techniques     were used.  Dose information is US SCROTUM W/ DOPPLER (CPT=93975/57335)   Final Result    PROCEDURE:  US SCROTUM W/ DOPPLER (CPT=93975/33110)         COMPARISON:  None.          INDICATIONS:  eval for torsion         TECHNIQUE:  Real time grey scale ultrasound was performed of the s TECHNIQUE:  AP chest radiograph was obtained. COMPARISON:  None. INDICATIONS:  fever, increased WBC's ~ MD Shala Arriaga wants patient admitted     for possible COVID.  Swab was completed but did not result yet         PATIENT STATED HISTORY: (As side    Disposition:  Admit  5/14/2020  6:09 pm    Follow-up:  No follow-up provider specified.         Medications Prescribed:  Current Discharge Medication List                       Present on Admission  Date Reviewed: 5/14/2020          ICD-10-CM Noted

## 2020-05-15 ENCOUNTER — APPOINTMENT (OUTPATIENT)
Dept: ULTRASOUND IMAGING | Facility: HOSPITAL | Age: 80
DRG: 300 | End: 2020-05-15
Attending: HOSPITALIST
Payer: MEDICARE

## 2020-05-15 PROCEDURE — 76937 US GUIDE VASCULAR ACCESS: CPT

## 2020-05-15 PROCEDURE — 36410 VNPNXR 3YR/> PHY/QHP DX/THER: CPT

## 2020-05-15 PROCEDURE — 82962 GLUCOSE BLOOD TEST: CPT

## 2020-05-15 PROCEDURE — 85025 COMPLETE CBC W/AUTO DIFF WBC: CPT | Performed by: HOSPITALIST

## 2020-05-15 PROCEDURE — 93970 EXTREMITY STUDY: CPT | Performed by: HOSPITALIST

## 2020-05-15 PROCEDURE — 84145 PROCALCITONIN (PCT): CPT | Performed by: INTERNAL MEDICINE

## 2020-05-15 PROCEDURE — 80053 COMPREHEN METABOLIC PANEL: CPT | Performed by: HOSPITALIST

## 2020-05-15 PROCEDURE — 87040 BLOOD CULTURE FOR BACTERIA: CPT | Performed by: INTERNAL MEDICINE

## 2020-05-15 RX ORDER — POLYETHYLENE GLYCOL 3350 17 G/17G
17 POWDER, FOR SOLUTION ORAL DAILY PRN
Status: DISCONTINUED | OUTPATIENT
Start: 2020-05-15 | End: 2020-05-21

## 2020-05-15 RX ORDER — SCOLOPAMINE TRANSDERMAL SYSTEM 1 MG/1
1 PATCH, EXTENDED RELEASE TRANSDERMAL
Status: COMPLETED | OUTPATIENT
Start: 2020-05-15 | End: 2020-05-18

## 2020-05-15 RX ORDER — BISACODYL 10 MG
10 SUPPOSITORY, RECTAL RECTAL
Status: DISCONTINUED | OUTPATIENT
Start: 2020-05-15 | End: 2020-05-21

## 2020-05-15 RX ORDER — ACETAMINOPHEN 325 MG/1
650 TABLET ORAL EVERY 6 HOURS PRN
Status: DISCONTINUED | OUTPATIENT
Start: 2020-05-15 | End: 2020-05-21

## 2020-05-15 RX ORDER — SENNA AND DOCUSATE SODIUM 50; 8.6 MG/1; MG/1
2 TABLET, FILM COATED ORAL 2 TIMES DAILY
Status: DISCONTINUED | OUTPATIENT
Start: 2020-05-15 | End: 2020-05-21

## 2020-05-15 NOTE — H&P
[facility] Hospitalist H&P       CC: Patient presents with:  Fever       PCP: Marium Bassett MD    History of Present Illness:  Patient is a 79year old male with PMH sig for DMII, HTN, HL presenting with 10 day h/o fevers and buttock/leg pain.  Pt was given m **OR** Glucose-Vitamin C **OR** dextrose **OR** glucose **OR** Glucose-Vitamin C, ondansetron HCl, morphINE sulfate **OR** morphINE sulfate **OR** morphINE sulfate       Soc Hx  Social History    Tobacco Use      Smoking status: Never Smoker      Smokeless leukocytosis  TECHNIQUE:  Multiplanar T1 and T2 weighted images including fat suppression sequences. Images acquired in sagittal and axial planes. Intravenous gadolinium was administered followed by multiplanar post-infusion T1 weighted sequences.    JODI Diffuse disc bulge with endplate osteophytes and a superimposed right far lateral disc protrusion. Mild facet arthropathy. There is no significant spinal canal stenosis. Mild right neural foraminal stenosis. CONCLUSION:   1.  No specific findings to sug right epididymal head cyst.  This may be postinflammatory or infectious in nature versus a spermatocele. 4. Coarse calcification left epididymis. This may be postinflammatory, infectious or traumatic in nature. 5. Nonspecific small right hydrocele.  6. Bor is transmitted to the [facility] ([facility]) [address]) which includes the Dose Index Registry.   PATIENT STATED HISTORY:(As transcribed by Technologist)  Bilateral low back pain that radiates down both lower extrem pain  - f/u bld cx  - abx per ID    # Scrotal swelling/pain  - scrotal u/s with doppler - no testicular torsion  - UA neg (although was on cipro)  - apprec urology recommendations and review - suspected L epidymidis.  Rec scrotal support, ice, abx per ID

## 2020-05-15 NOTE — CONSULTS
INFECTIOUS DISEASE CONSULT NOTE    Juan Cruz Patient Status:  Inpatient    1940 MRN [id number]   [facility] 4NW-A Attending Moris Sherman MD   Hosp Day # 1 PCP Jeri Montana smoked. He has never used smokeless tobacco. He reports that he does not drink alcohol or use drugs.     Allergies:  No Known Allergies    Medications:    Current Facility-Administered Medications:   •  scopolamine (TRANSDERM-SCOP) patch, 1 patch, Transderm pruritus. Hematologic/lymphatic: Negative for easy bruising, bleeding  Musculoskeletal: Negative for new arthralgias, arthritis.   Neurological: Negative for headaches, focal neuro deficits  Behavioral/Psych: Negative for anxiety or depression    Physical GLUUR Negative   BILUR Negative   KETUR Negative   BLOODURINE Negative   PHURINE 6.0   PROUR Negative   UROBILINOGEN <2.0   NITRITE Negative   LEUUR Negative     COVID-19 Lab Results    COVID-19  Lab Results   Component Value Date    COVID19 Not Detected disc bulge with endplate osteophytes and mild facet arthropathy. There is no significant spinal canal or neural foraminal stenosis. L1-2:  Diffuse disc bulge with endplate osteophytes and mild facet arthropathy.  There is no significant spinal canal or doris STATED HISTORY: (As transcribed by Technologist)  Patient complains of bilateral sciatic nerve pain. FINDINGS:  TESTES:  Each testicle is identified within the respective hemiscrotum. Testicular contours are smooth. Right 4.0 x 2.2 x 2.9 cm.  Left 3.9 CONCLUSION:  Mild atelectasis/scarring in the lower lungs with underlying infiltrates not excluded. Clinical correlation recommended.     Dictated by: Amaury Slade MD on 5/14/2020 at 4:46 PM     Finalized by: Amaury Slade MD on 5/14/2020 at 4:47 PM or pelvis. BONES:  Degenerative changes in the spine and hips. OTHER:  None. CONCLUSION:   1. No acute intra-abdominal/pelvic process identified. 2. Uncomplicated colonic diverticulosis. 3. Mild prostate enlargement.   Please see above for further detai allowing me to participate in the care of this patient. Please do not hesitate to call if you have any questions. I will continue to follow with you and will make further recommendations based on his progress.     Michael Liz  5/15/2020  10:03 AM

## 2020-05-15 NOTE — CONSULTS
[facility]    Report of Consultation    Linda Cruel Patient Status:  Inpatient    1940 MRN [id number]   [facility] 4NW-A Attending Daniel Duran MD   Hosp Day # 1 PCP Jace Fisher MD     Date of Admission:  2020  D pt sent in to the ED and admitted. He completed Cipro the day before yesterday, received levaquin overnight.       Past Medical History  Past Medical History:   Diagnosis Date   • Colon polyps    • Diabetes [facility])    • Diverticular disease    • Ear disor PRN    Or  morphINE sulfate (PF) 4 MG/ML injection 4 mg, 4 mg, Intravenous, Q2H PRN      [] Ciprofloxacin HCl 500 MG Oral Tab, Take 1 tablet (500 mg total) by mouth 2 (two) times daily for 7 days.   AMLODIPINE BESYLATE 2.5 MG Oral Tab, TAKE 1 TABLET Component Value Date    WBC 18.0 (H) 05/15/2020    HGB 11.4 (L) 05/15/2020    HCT 33.8 (L) 05/15/2020    .0 (H) 05/15/2020    CREATSERUM 1.43 (H) 05/15/2020    BUN 22 (H) 05/15/2020     (L) 05/15/2020    K 4.1 05/15/2020     05/15/2020 left epididymis. This may be postinflammatory, infectious or traumatic in nature. 5. Nonspecific small right hydrocele. 6. Borderline to mild right varicoceles. 7. Continued clinical correlation recommended.              Dictated by: Henrique Smiley MD on 5

## 2020-05-16 PROCEDURE — 80053 COMPREHEN METABOLIC PANEL: CPT | Performed by: INTERNAL MEDICINE

## 2020-05-16 PROCEDURE — 82962 GLUCOSE BLOOD TEST: CPT

## 2020-05-16 NOTE — PROGRESS NOTES
Patient with improvement in testicular symptoms with scrotal support/ICE. In shower at time of rounding this AM.    Notes/chart reviewed.     With improvement in symptoms, I will recheck Lynn Linda Najera tomorrow, but am available for any interval JULISSA perez

## 2020-05-16 NOTE — PLAN OF CARE
Problem: Diabetes/Glucose Control  Goal: Glucose maintained within prescribed range  Description  INTERVENTIONS:  - Monitor Blood Glucose as ordered  - Assess for signs and symptoms of hyperglycemia and hypoglycemia  - Administer ordered medications to m better per patient, wearing scrotal support, ice pack applied, on IV ABT, up to the bathroom  with1 assist, still no bowel movement, on bowel regimen, MOM  given as prn, bowel sounds present.    Warm compress applied earlier  to  left thigh due to superfici

## 2020-05-16 NOTE — PROGRESS NOTES
INFECTIOUS DISEASE PROGRESS NOTE    Rafael Agrawal Patient Status:  Inpatient    1940 MRN [id number]   [facility] 4NW-A Attending Luigi Ca MD   [facility] Day # 2 PCP Ann Duff MG/ML injection 2 mg, 2 mg, Intravenous, Q2H PRN **OR** morphINE sulfate (PF) 4 MG/ML injection 4 mg, 4 mg, Intravenous, Q2H PRN    Review of Systems:    Completed. See pertinent positives and negatives in the the HPI.     Constitutional: as above  Eyes: Ne RBC 4.16 4.04 3.66*   HGB 13.0 12.4* 11.4*   HCT 39.1 37.9* 33.8*   MCV 94.0 93.8 92.3   MCH 31.3 30.7 31.1   MCHC 33.2 32.7 33.7   RDW 12.0 12.0 11.9   NEPRELIM  --  15.64* 14.44*   WBC 19.35* 19.7* 18.0*   * 484.0* 469.0*     Recent Labs   Lab 0 were attempted. There is normal lumbar lordosis with anatomic alignment. Vertebral body heights are well-maintained.   Mild disc desiccation, scattered mild disc height loss, and scattered endplate spurring in the lumbar spine most pronounced at the L1-2 right neural foraminal stenosis at L5-S1.  4. Facet arthropathy throughout the lumbar spine, most pronounced at L4-5. Please see above for further details.      Dictated by: Paige Zelaya MD on 5/14/2020 at 8:46 PM     Finalized by: Paige Zelaya MD on PM          Xr Chest Ap Portable  (cpt=71045)    Result Date: 5/14/2020  PROCEDURE:  XR CHEST AP PORTABLE  (CPT=71045)  TECHNIQUE:  AP chest radiograph was obtained. COMPARISON:  None.   INDICATIONS:  fever, increased WBC's ~ MD Luis Cotton wants patient admit Scattered atelectasis/scarring in the partially imaged lower lungs LIVER:  Transient hepatic attenuation difference in the liver. BILIARY:  Unremarkable. SPLEEN:  Unremarkable. PANCREAS:  Unremarkable. ADRENALS:  Unremarkable. KIDNEYS:  Unremarkable.  AORT discitis not seen in initial imaging    2. Bilat buttock pain radiating to legs- only some DJD but not severe. No evidence of SI joint infection either    3. Recent testicular pain- now resolved.  Only mild pain on L side on exam but exam otherwise unremark

## 2020-05-16 NOTE — PLAN OF CARE
Assumed care of pt at 0730. Pt A&O x4. VSS. Afebrile. Pt c/o dizziness and requesting nausea medication. PRN zofran administered w/ relief noted. Pt had venous doppler completed today and results showed superficial thrombophlebitis in left thigh.  Dr. Josh Emanuel appropriate  Outcome: Progressing     Problem: SAFETY ADULT - FALL  Goal: Free from fall injury  Description  INTERVENTIONS:  - Assess pt frequently for physical needs  - Identify cognitive and physical deficits and behaviors that affect risk of falls.   -

## 2020-05-16 NOTE — PROGRESS NOTES
[facility] Hospitalist Progress Note                                                                   [address] [facility]  4/5/1940    SUBJECTIVE:  tmax 100.       OBJECTIVE:  Temp:  [97.8 °F (36.6 Glucose-Vitamin C **OR** dextrose **OR** glucose **OR** Glucose-Vitamin C, ondansetron HCl, morphINE sulfate **OR** morphINE sulfate **OR** morphINE sulfate    Assessment/Plan:  Principal Problem:    Fever, unspecified fever cause  Active Problems:    Leuk

## 2020-05-17 PROCEDURE — 82962 GLUCOSE BLOOD TEST: CPT

## 2020-05-17 RX ORDER — DIAZEPAM 5 MG/ML
5 INJECTION, SOLUTION INTRAMUSCULAR; INTRAVENOUS EVERY 6 HOURS SCHEDULED
Status: DISCONTINUED | OUTPATIENT
Start: 2020-05-17 | End: 2020-05-18

## 2020-05-17 RX ORDER — HYDROCODONE BITARTRATE AND ACETAMINOPHEN 5; 325 MG/1; MG/1
1 TABLET ORAL EVERY 6 HOURS PRN
Status: DISCONTINUED | OUTPATIENT
Start: 2020-05-17 | End: 2020-05-21

## 2020-05-17 NOTE — PLAN OF CARE
Problem: Diabetes/Glucose Control  Goal: Glucose maintained within prescribed range  Description  INTERVENTIONS:  - Monitor Blood Glucose as ordered  - Assess for signs and symptoms of hyperglycemia and hypoglycemia  - Administer ordered medications to m constipation, alert and oriented, currently denies further scrotal pain,  Encouraged  to cont to use scrotal support, denies left leg pain, up with assist. NO s/s of distress. Temp 99's.

## 2020-05-17 NOTE — PLAN OF CARE
Pt. A&O x4. Pt. With low grade temp of 99.3; PRN tylenol given per MAR. Pt. Reporting scrotal swelling is much better than yesterday. Scrotal sling in place. Pt. Reporting discomfort to the left leg; warm pack placed as requested.  Pt. C/o constipation; PRN pt to call for assistance with activity based on assessment  - Modify environment to reduce risk of injury  - Provide assistive devices as appropriate  - Consider OT/PT consult to assist with strengthening/mobility  - Encourage toileting schedule  Outcome:

## 2020-05-17 NOTE — PROGRESS NOTES
[facility] Hospitalist Progress Note                                                                   [address] [facility]  4/5/1940    SUBJECTIVE:  tmax 99.5.       Has pulling in bl hamstring with Infusions:     PRN: HYDROcodone-acetaminophen, PEG 3350, magnesium hydroxide, bisacodyl, acetaminophen, glucose **OR** Glucose-Vitamin C **OR** dextrose **OR** glucose **OR** Glucose-Vitamin C, ondansetron HCl, morphINE sulfate **OR** morphINE sulfate **OR

## 2020-05-17 NOTE — PLAN OF CARE
A&Ox4. VSS, afebrile. Patient reporting discomfort to left leg- relieved with PRN Tylenol. Scrotal swelling, discomfort. Scrotal sling in place with ice packs PRN; PRN Morphine administered. Intermittent nausea- PRN antiemetics administered.   IVF and I

## 2020-05-17 NOTE — PROGRESS NOTES
[facility]    Progress Note    Fausto Cordero Patient Status:  Inpatient    1940 MRN [id number]   [facility] 4NW-A Attending Say Callahan MD   [facility] Day # 3 PCP Radha Haq MD        Subjective:   Fausto Cordero 14.44*   WBC 19.35* 19.7* 18.0*   * 484.0* 469.0*             Assessment and Plan:   78 yo male with fever/leukocytosis of uncertain etiology with suspicion of left epididymitis (albeit not so robust to necessarily result in secondary clinical findi

## 2020-05-18 ENCOUNTER — APPOINTMENT (OUTPATIENT)
Dept: CT IMAGING | Facility: HOSPITAL | Age: 80
DRG: 300 | End: 2020-05-18
Attending: INTERNAL MEDICINE
Payer: MEDICARE

## 2020-05-18 PROCEDURE — 97161 PT EVAL LOW COMPLEX 20 MIN: CPT

## 2020-05-18 PROCEDURE — 97530 THERAPEUTIC ACTIVITIES: CPT

## 2020-05-18 PROCEDURE — 71260 CT THORAX DX C+: CPT | Performed by: INTERNAL MEDICINE

## 2020-05-18 PROCEDURE — 74177 CT ABD & PELVIS W/CONTRAST: CPT | Performed by: INTERNAL MEDICINE

## 2020-05-18 PROCEDURE — 80048 BASIC METABOLIC PNL TOTAL CA: CPT | Performed by: PHYSICIAN ASSISTANT

## 2020-05-18 PROCEDURE — 82962 GLUCOSE BLOOD TEST: CPT

## 2020-05-18 PROCEDURE — 85027 COMPLETE CBC AUTOMATED: CPT | Performed by: PHYSICIAN ASSISTANT

## 2020-05-18 PROCEDURE — 87040 BLOOD CULTURE FOR BACTERIA: CPT | Performed by: INTERNAL MEDICINE

## 2020-05-18 RX ORDER — SODIUM CHLORIDE 9 MG/ML
INJECTION, SOLUTION INTRAVENOUS CONTINUOUS
Status: DISCONTINUED | OUTPATIENT
Start: 2020-05-18 | End: 2020-05-19

## 2020-05-18 RX ORDER — DIAZEPAM 5 MG/ML
5 INJECTION, SOLUTION INTRAMUSCULAR; INTRAVENOUS EVERY 8 HOURS PRN
Status: DISCONTINUED | OUTPATIENT
Start: 2020-05-18 | End: 2020-05-21

## 2020-05-18 NOTE — PROGRESS NOTES
INFECTIOUS DISEASE PROGRESS NOTE    Anabela Jay Patient Status:  Inpatient    1940 MRN [id number]   [facility] 4NW-A Attending Ana Sterling MD   [facility] Day # 4 PCP Jackie Urena **OR** Glucose-Vitamin C (DEX-4) chewable tab 8 tablet, 8 tablet, Oral, Q15 Min PRN  •  Insulin Aspart Pen (NOVOLOG) 100 UNIT/ML flexpen 1-5 Units, 1-5 Units, Subcutaneous, TID CC and HS  •  ondansetron HCl (ZOFRAN) injection 4 mg, 4 mg, Intravenous, Q6H P 0.8 0.8 0.6   TP 5.8* 5.8* 6.0*     Recent Labs   Lab 05/14/20 1907   COLORUR Yellow   CLARITY Clear   SPECGRAVITY 1.017   GLUUR Negative   BILUR Negative   KETUR Negative   BLOODURINE Negative   PHURINE 6.0   PROUR Negative   UROBILINOGEN <2.0   NITRITE thrombophlebitis involving the left greater saphenous vein in the upper left thigh over a 5.3 cm length. 3. The critical test results were called to the patient's nurse, Christophe Laboy, at 1040 hours on 5/15/2020 with appropriate read back.      Dictated by: Susan Jarrell labs; Would like to recheck CT C/A/P with po and IV contrast given persistent fevers. Will d/w hospitalist--> may need to give some fluids prior to imaging. Discussed case with patient and Dr. Sreekanth Medina. Reynaldo Vo PA-C    ID ATTENDING ADDEND

## 2020-05-18 NOTE — PLAN OF CARE
Pt alert and oriented x4. VSS. At 0005, pts temp 101.5. Infectious disease MD paged, orders for blood cultures. Tylenol given per MAR. Denies pain or SOB. QID accu check, insulin given per MAR. Fall precautions in place. Call light within reach.  Will albert injury  - Provide assistive devices as appropriate  - Consider OT/PT consult to assist with strengthening/mobility  - Encourage toileting schedule  Outcome: Progressing

## 2020-05-18 NOTE — PROGRESS NOTES
[facility] Hospitalist Progress Note                                                                   [address] [facility]  4/5/1940    SUBJECTIVE: Fever 101.5. Sleepy now after IV ativan.   Still w Continuous Infusions:   • sodium chloride       PRN: diazepam, HYDROcodone-acetaminophen, PEG 3350, magnesium hydroxide, bisacodyl, acetaminophen, glucose **OR** Glucose-Vitamin C **OR** dextrose **OR** glucose **OR** Glucose-Vitamin C, ondansetron HCl

## 2020-05-18 NOTE — PHYSICAL THERAPY NOTE
PHYSICAL THERAPY QUICK EVALUATION - INPATIENT    Room Number: 417/417-A  Evaluation Date: 5/18/2020  Presenting Problem: low back pain with radiation, fever scrotal swelling  Physician Order: PT Eval and Treat  History:  Admitted with low back pain and b 3  Location: low back  Management Techniques: Activity promotion; Body mechanics;Repositioning   RANGE OF MOTION AND STRENGTH ASSESSMENT  Upper extremity ROM and strength are within functional limits     Lower extremity ROM is within functional limits     L independently. Gait trained without devic sabina levels with supervision for safety. Patient educated to perform frequent position changes, sit with feet supported in firm chairs & to ambulate frequently.   Patient also educated in PT recommendation for OP PT

## 2020-05-19 ENCOUNTER — APPOINTMENT (OUTPATIENT)
Dept: ULTRASOUND IMAGING | Facility: HOSPITAL | Age: 80
DRG: 300 | End: 2020-05-19
Attending: INTERNAL MEDICINE
Payer: MEDICARE

## 2020-05-19 PROCEDURE — 86698 HISTOPLASMA ANTIBODY: CPT | Performed by: PHYSICIAN ASSISTANT

## 2020-05-19 PROCEDURE — 82962 GLUCOSE BLOOD TEST: CPT

## 2020-05-19 PROCEDURE — 86140 C-REACTIVE PROTEIN: CPT | Performed by: INTERNAL MEDICINE

## 2020-05-19 PROCEDURE — 82728 ASSAY OF FERRITIN: CPT | Performed by: INTERNAL MEDICINE

## 2020-05-19 PROCEDURE — 85027 COMPLETE CBC AUTOMATED: CPT | Performed by: PHYSICIAN ASSISTANT

## 2020-05-19 PROCEDURE — 86612 BLASTOMYCES ANTIBODY: CPT | Performed by: PHYSICIAN ASSISTANT

## 2020-05-19 PROCEDURE — 85730 THROMBOPLASTIN TIME PARTIAL: CPT | Performed by: INTERNAL MEDICINE

## 2020-05-19 PROCEDURE — 80053 COMPREHEN METABOLIC PANEL: CPT | Performed by: INTERNAL MEDICINE

## 2020-05-19 PROCEDURE — 83540 ASSAY OF IRON: CPT | Performed by: INTERNAL MEDICINE

## 2020-05-19 PROCEDURE — 93970 EXTREMITY STUDY: CPT | Performed by: INTERNAL MEDICINE

## 2020-05-19 PROCEDURE — 85027 COMPLETE CBC AUTOMATED: CPT | Performed by: INTERNAL MEDICINE

## 2020-05-19 PROCEDURE — 85652 RBC SED RATE AUTOMATED: CPT | Performed by: INTERNAL MEDICINE

## 2020-05-19 PROCEDURE — 83550 IRON BINDING TEST: CPT | Performed by: INTERNAL MEDICINE

## 2020-05-19 RX ORDER — HEPARIN SODIUM 5000 [USP'U]/ML
80 INJECTION INTRAVENOUS; SUBCUTANEOUS ONCE
Status: COMPLETED | OUTPATIENT
Start: 2020-05-19 | End: 2020-05-19

## 2020-05-19 RX ORDER — HEPARIN SODIUM AND DEXTROSE 10000; 5 [USP'U]/100ML; G/100ML
INJECTION INTRAVENOUS CONTINUOUS
Status: DISCONTINUED | OUTPATIENT
Start: 2020-05-19 | End: 2020-05-20

## 2020-05-19 RX ORDER — HEPARIN SODIUM AND DEXTROSE 10000; 5 [USP'U]/100ML; G/100ML
18 INJECTION INTRAVENOUS ONCE
Status: COMPLETED | OUTPATIENT
Start: 2020-05-19 | End: 2020-05-19

## 2020-05-19 NOTE — PLAN OF CARE
Patient alert and oriented x4. Patient complaining of mild pain to left leg and given PRN tylenol per request. Patient afebrile overnight. Patient on room air. Patient on telemetry running NSR.  Patient with mild scrotal swelling which he states is much winifred needs  - Identify cognitive and physical deficits and behaviors that affect risk of falls.   - [place] fall precautions as indicated by assessment.  - Educate pt/family on patient safety including physical limitations  - Instruct pt to call for assistance

## 2020-05-19 NOTE — PROGRESS NOTES
INFECTIOUS DISEASE PROGRESS NOTE    Anabela Jay Patient Status:  Inpatient    1940 MRN [id number]   [facility] 4NW-A Attending Ana Sterling MD   [facility] Day # 5 PCP Jackie Urena Q15 Min PRN **OR** dextrose 50 % injection 50 mL, 50 mL, Intravenous, Q15 Min PRN **OR** glucose (DEX4) oral liquid 30 g, 30 g, Oral, Q15 Min PRN **OR** Glucose-Vitamin C (DEX-4) chewable tab 8 tablet, 8 tablet, Oral, Q15 Min PRN  •  Insulin Aspart Pen (NO 189* 128*   BUN 22* 20* 21* 19*   CREATSERUM 1.43* 1.41* 1.25 1.19   GFRAA 53* 54* 63 67   GFRNAA 46* 47* 54* 58*   CA 7.6* 7.8* 7.4* 7.4*   ALB 1.6* 1.4*  --  1.2*   * 135* 135* 136   K 4.1 4.7 4.0 4.3    107 105 108   CO2 22.0 23.0 22.0 21.0 pericardial effusion. PLEURA: Small pleural effusions at the lung bases, right greater than left.  Calcified plaque is seen at the right base, and also left upper chest. There is an associated soft tissue mass on the left measuring 2.9 x 1.2 cm series 2 im illness  - with pain radiating from thigh to lower legs. Also had some testicular pain and swelling but this has resolved and fevers have persisted. UA clear. No acute changes.   - has leukocytosis (although recently on steroids) and elevated inflammatory m dopplers  Discussed case with patient, RN, Dr. Shannan Fragoso and Dr. Malissa Perry. Zahra Veronica PA-C    ID ATTENDING ADDENDUM     Pt seen an examined independently. Chart reviewed. Agree with above. Note has been reviewed by me and modified as needed.

## 2020-05-19 NOTE — CONSULTS
Pulmonary H&P/Consult       NAME: Sb Cheng - ROOM: [address] [place], [place] [address] - MRN: [id number] - Age: 79year old - :  1940    Date of Admission: 2020  3:35 PM  Admission Diagnosis: Sciatica of left side [M54.32]  Fever, unspecified fever cause [R50.9 LANCETS 33G Does not apply Misc, 1 each by Does not apply route daily. , Disp: 100 each, Rfl: 3, Taking  Glucose Blood (ONETOUCH TEST) In Vitro Strip, Use to test blood sugar 2 to 3 times a day, Disp: 300 strip, Rfl: 1, Taking  TRESIBA FLEXTOUCH 100 UNIT/ML Not on file    Other Topics      Concerns:        Not on file    Social History Narrative      Not on file         Family History:  Family History   Problem Relation Age of Onset   • Other (Other) Sister         BRAIN HEMMORRAGE        Home Medications:  [ BP: 139/54 115/51 131/62 112/39   BP Location: Left arm Left arm Left arm Left arm   Pulse: 84 68 64 67   Resp: 16 16 16 18   Temp: 97.8 °F (36.6 °C) 98 °F (36.7 °C) 98.2 °F (36.8 °C) 98.3 °F (36.8 °C)   TempSrc: Oral Oral Oral Oral   SpO2: 94% 94% 95% 9 reflexes       throughout         Labs reviewed as noted below    Imaging: CT chest reviewed as noted above    ASSESSMENT/PLAN:    1.  Pleural Based mass  -denies asbestos exposure or smoking making mesothelioma less likely  -would pursue opt PET to further

## 2020-05-19 NOTE — PROGRESS NOTES
[facility] Hospitalist Progress Note                                                                   [address] [facility]  4/5/1940    SUBJECTIVE:  Afebrile the last 24 hours. Sleepy.   Leg pain st atorvastatin  20 mg Oral Nightly   • Insulin Aspart Pen  1-5 Units Subcutaneous TID CC and HS     Continuous Infusions:   • Continuous dose Heparin infusion       PRN: diazepam, HYDROcodone-acetaminophen, PEG 3350, magnesium hydroxide, bisacodyl, acetamino

## 2020-05-19 NOTE — PLAN OF CARE
Pt alert and oriented x4, room air, VSS. C/o bilateral leg pain that pulls at the thigh. PRN tylenol given this afternoon with relief. IVF and IV abx. Infectious disease consulted d/t persistent fevers; pt afebrile this shift.  Pt was c/o chills this mornin patient safety including physical limitations  - Instruct pt to call for assistance with activity based on assessment  - Modify environment to reduce risk of injury  - Provide assistive devices as appropriate  - Consider OT/PT consult to assist with streng

## 2020-05-19 NOTE — CONSULTS
Hematology      Case reviewed with Dr. Raven Carter. I attempted to see him today but he is fast asleep. Reviewed with RNs. Imaging reviewed, labs reviewed. Impression  1. Febrile illness, back pain of unclear etiology. No obvious source has arisen.   2

## 2020-05-19 NOTE — PLAN OF CARE
Pt alert and oriented x4, fatigued. VSS. Still with c/o pain to BLE, per pt primarily left leg and pain is ok at rest but worsens with activity. No redness or swelling noted in BLE. Afebrile.  Venous doppler of BLE revealing acute DVT RLE; MD notified of cr physical deficits and behaviors that affect risk of falls.   - [place] fall precautions as indicated by assessment.  - Educate pt/family on patient safety including physical limitations  - Instruct pt to call for assistance with activity based on assessme

## 2020-05-20 ENCOUNTER — APPOINTMENT (OUTPATIENT)
Dept: NUCLEAR MEDICINE | Facility: HOSPITAL | Age: 80
DRG: 300 | End: 2020-05-20
Attending: INTERNAL MEDICINE
Payer: MEDICARE

## 2020-05-20 PROCEDURE — 86147 CARDIOLIPIN ANTIBODY EA IG: CPT | Performed by: INTERNAL MEDICINE

## 2020-05-20 PROCEDURE — 85049 AUTOMATED PLATELET COUNT: CPT | Performed by: INTERNAL MEDICINE

## 2020-05-20 PROCEDURE — 86146 BETA-2 GLYCOPROTEIN ANTIBODY: CPT | Performed by: INTERNAL MEDICINE

## 2020-05-20 PROCEDURE — 85610 PROTHROMBIN TIME: CPT | Performed by: INTERNAL MEDICINE

## 2020-05-20 PROCEDURE — 85025 COMPLETE CBC W/AUTO DIFF WBC: CPT | Performed by: INTERNAL MEDICINE

## 2020-05-20 PROCEDURE — 82962 GLUCOSE BLOOD TEST: CPT

## 2020-05-20 PROCEDURE — 85730 THROMBOPLASTIN TIME PARTIAL: CPT | Performed by: INTERNAL MEDICINE

## 2020-05-20 PROCEDURE — 85705 THROMBOPLASTIN INHIBITION: CPT | Performed by: INTERNAL MEDICINE

## 2020-05-20 PROCEDURE — 78806 NM INFECTION INDIUM WBC 111 COMPLETE  (CPT=78806): CPT | Performed by: INTERNAL MEDICINE

## 2020-05-20 PROCEDURE — 85732 THROMBOPLASTIN TIME PARTIAL: CPT | Performed by: INTERNAL MEDICINE

## 2020-05-20 PROCEDURE — 85613 RUSSELL VIPER VENOM DILUTED: CPT | Performed by: INTERNAL MEDICINE

## 2020-05-20 NOTE — PROGRESS NOTES
[facility] Hospitalist Progress Note                                                                   321 [facility]  4/5/1940    SUBJECTIVE:  Remains afebrile.   Per RN leg pain and swelling a 1-5 Units Subcutaneous TID CC and HS     Continuous Infusions:   • Continuous dose Heparin infusion 1,300 Units/hr (05/20/20 0927)     PRN: diazepam, HYDROcodone-acetaminophen, PEG 3350, magnesium hydroxide, bisacodyl, acetaminophen, glucose **OR** Glucose

## 2020-05-20 NOTE — PLAN OF CARE
Scrotal and BLE edema improving. Scrotal support on. Denied pain until approx 1600 after up to BR. Medicated w/norco w/good relief. C/o L ear feeling plugged and unable to hear well. MD notidied. Afebrile this shift. Up in room w/SBA. Gait steady.   Proble devices as appropriate  - Consider OT/PT consult to assist with strengthening/mobility  - Encourage toileting schedule  Outcome: Progressing     Problem: Impaired Functional Mobility  Goal: Achieve highest/safest level of mobility/gait  Description  Aviva Valdes

## 2020-05-20 NOTE — PROGRESS NOTES
Hematology    Ok to start Xarelto this evening. Ok to stop heparin now and start Xarelto as scheduled tonight. This has been reviewed with Dr. Edith Tran and Dr. Martin García. I have left a message for the son earlier today.     Katelynn Stahl

## 2020-05-20 NOTE — PROGRESS NOTES
[facility]  Progress Note    Fausto Cordero Patient Status:  Inpatient    1940 MRN [id number]   [facility] 4[facility]-A Attending Say Callahan MD   [facility] Day # 6 PCP Radha Haq MD     Subjective:  Fausto Cordero is a Type 2 diabetes mellitus with hyperglycemia, without long-term current use of insulin (HCC)     Hypercholesteremia     Colon polyps     Internal hemorrhoids     Diverticular disease     Fever, unspecified fever cause     Leukocytosis, unspecified type

## 2020-05-20 NOTE — PLAN OF CARE
Patient alert and oriented x4. Patient complaining of some leg pain and given PRN tylenol per request. Patient started on heparin gtt yesterday for positive DVT in right leg. The heparin gtt is currently at 13mL/hr.  PTT level checked at 8pm and was 78.7 an indicated by assessment.  - Educate pt/family on patient safety including physical limitations  - Instruct pt to call for assistance with activity based on assessment  - Modify environment to reduce risk of injury  - Provide assistive devices as appropriat

## 2020-05-20 NOTE — PROGRESS NOTES
INFECTIOUS DISEASE PROGRESS NOTE    Hubbard Hashimoto Patient Status:  Inpatient    1940 MRN [id number]   [facility] 4NW-A Attending All Modi MD   [facility] Day # 6 PCP Benoit Shin 30 g, 30 g, Oral, Q15 Min PRN **OR** Glucose-Vitamin C (DEX-4) chewable tab 8 tablet, 8 tablet, Oral, Q15 Min PRN  •  Insulin Aspart Pen (NOVOLOG) 100 UNIT/ML flexpen 1-5 Units, 1-5 Units, Subcutaneous, TID CC and HS  •  ondansetron HCl (ZOFRAN) injection Labs   Lab 05/15/20  0701 05/16/20  0727 05/18/20  1211 05/19/20  0659 05/19/20  1357   * 144* 189* 128* 197*   BUN 22* 20* 21* 19* 18   CREATSERUM 1.43* 1.41* 1.25 1.19 1.23   GFRAA 53* 54* 63 67 64   GFRNAA 46* 47* 54* 58* 55*   CA 7.6* 7.8* 7.4* junction, and posterior tibial veins. FINDINGS:   SAPHENOFEMORAL JUNCTION: The right side remains unremarkable.  There is persistent nonocclusive superficial venous thrombosis along the left greater saphenous vein and left saphenofemoral junction extendi hypoalbuminemia--> assume related to underlying infection or inflammatory process but lack of response to steroids would make infection more likely. - R/o blood stream infection but unfortunately was given abx as outpt before any Bcx sent.  Also consider like \"pulling\" behind legs when he stands up. Assume musculoskeletal at this point. All cx remain neg. For tagged WBC scan today  D/w staff and with pt. D/w Dr Radha Belcher.  Should be able to go home soon from my standpoint if tagged wbc scan unrevealing (oth

## 2020-05-21 ENCOUNTER — APPOINTMENT (OUTPATIENT)
Dept: NUCLEAR MEDICINE | Facility: HOSPITAL | Age: 80
DRG: 300 | End: 2020-05-21
Attending: INTERNAL MEDICINE
Payer: MEDICARE

## 2020-05-21 VITALS
RESPIRATION RATE: 18 BRPM | SYSTOLIC BLOOD PRESSURE: 150 MMHG | OXYGEN SATURATION: 98 % | DIASTOLIC BLOOD PRESSURE: 65 MMHG | TEMPERATURE: 99 F | BODY MASS INDEX: 27.45 KG/M2 | HEART RATE: 87 BPM | HEIGHT: 64 IN | WEIGHT: 160.81 LBS

## 2020-05-21 PROCEDURE — 82962 GLUCOSE BLOOD TEST: CPT

## 2020-05-21 PROCEDURE — 85025 COMPLETE CBC W/AUTO DIFF WBC: CPT | Performed by: INTERNAL MEDICINE

## 2020-05-21 PROCEDURE — 85730 THROMBOPLASTIN TIME PARTIAL: CPT | Performed by: INTERNAL MEDICINE

## 2020-05-21 PROCEDURE — 85049 AUTOMATED PLATELET COUNT: CPT | Performed by: INTERNAL MEDICINE

## 2020-05-21 RX ORDER — SENNA AND DOCUSATE SODIUM 50; 8.6 MG/1; MG/1
2 TABLET, FILM COATED ORAL 2 TIMES DAILY
Qty: 60 TABLET | Refills: 0 | Status: SHIPPED | OUTPATIENT
Start: 2020-05-21 | End: 2021-12-01

## 2020-05-21 RX ORDER — HYDROCODONE BITARTRATE AND ACETAMINOPHEN 5; 325 MG/1; MG/1
1 TABLET ORAL EVERY 8 HOURS PRN
Qty: 10 TABLET | Refills: 0 | Status: SHIPPED | OUTPATIENT
Start: 2020-05-21 | End: 2020-06-25 | Stop reason: ALTCHOICE

## 2020-05-21 NOTE — PHYSICAL THERAPY NOTE
Duplicate PT order received, chart reviewed. Pt evaluated and dc'ed from acute PT on 5/18 with recs for home, outpatient PT. Per chart, pt has remained up with SBA and steady gait. Will again dc as no skilled intervention required.

## 2020-05-21 NOTE — PROGRESS NOTES
[facility] Hospitalist Progress Note                                                                   321 [facility]  4/5/1940    SUBJECTIVE:    - Doing well today, no new issues      OBJECTIV bisacodyl, acetaminophen, glucose **OR** Glucose-Vitamin C **OR** dextrose **OR** glucose **OR** Glucose-Vitamin C, ondansetron HCl, morphINE sulfate **OR** morphINE sulfate **OR** morphINE sulfate    Assessment/Plan:  Principal Problem:    Fever, unspecif

## 2020-05-21 NOTE — PLAN OF CARE
PT A/O, 93% ON RA, PRODUCTIVE COUGH, SR, LE EDEMA, VOIDING IN BATHROOM, IV SL, UP WITH SB ASSIST, C/O OF AUBREE THIGH PAIN WHEN UP, SUBSIDED WHEN BACK IN BED, AFEBRILE, LABS, SCAN IN AM.    Problem: PAIN - ADULT  Goal: Verbalizes/displays adequate comfort lev

## 2020-05-21 NOTE — PROGRESS NOTES
INFECTIOUS DISEASE PROGRESS NOTE    Sb Cheng Patient Status:  Inpatient    1940 MRN [id number]   [facility] 4NW-A Attending Mila Osborne MD   [facility] Day # 7 PCP Sushila Parnell UNIT/ML flexpen 1-5 Units, 1-5 Units, Subcutaneous, TID CC and HS  •  ondansetron HCl (ZOFRAN) injection 4 mg, 4 mg, Intravenous, Q6H PRN  •  morphINE sulfate (PF) 4 MG/ML injection 1 mg, 1 mg, Intravenous, Q2H PRN **OR** morphINE sulfate (PF) 4 MG/ML inje 165* 144* 189* 128* 197*   BUN 22* 20* 21* 19* 18   CREATSERUM 1.43* 1.41* 1.25 1.19 1.23   GFRAA 53* 54* 63 67 64   GFRNAA 46* 47* 54* 58* 55*   CA 7.6* 7.8* 7.4* 7.4* 7.5*   ALB 1.6* 1.4*  --  1.2*  --    * 135* 135* 136 136   K 4.1 4.7 4.0 4.3 3.9 hypoalbuminemia--> assume related to underlying infection or inflammatory process but lack of response to steroids would make infection more likely. - Blood cultures have all been negative. Unfortunately was given abx as outpt before any Bcx sent.  Blood neg, no obvious infection identified  Liya Gouty for home today from my standpoint if ok with other MDs  Plan for PET scan as outpt  Consider PT as outpt to help with leg pain?   D/w staff and with pt    Octavia Cuba MD

## 2020-05-21 NOTE — PLAN OF CARE
Afebrile in the last 24 ours, WBC is still elevated, awaits for completion of indium scan, out of bed to the bathroom, states has pain in his both legs with ambulation and with sitting on the chair,  pain goes away when lying in bed.  Pt has difficulties he Instruct pt to call for assistance with activity based on assessment  - Modify environment to reduce risk of injury  - Provide assistive devices as appropriate  - Consider OT/PT consult to assist with strengthening/mobility  - Encourage toileting schedule

## 2020-05-21 NOTE — PROGRESS NOTES
[facility]  Progress Note    Sarabjit Moya Patient Status:  Inpatient    1940 MRN [id number]   [facility] 4NW-A Attending Marthenia Goodell, MD   [facility] Day # 7 PCP Chapis Alex MD     Subjective:  Sarabjit Moya is a sapheno-femoral junction, and posterior tibial veins. PATIENT STATED HISTORY: (As transcribed by Technologist)           FINDINGS:    SAPHENOFEMORAL JUNCTION:  No reflux. THROMBI:  There is no DVT identified.   There is superficial thrombophlebitis inv the left common femoral vein. THROMBI:  There is new occlusive deep venous thrombosis in 1 of the paired proximal to mid right posterior tibial veins. Persistent nonocclusive superficial venous thrombosis in the left saphenofemoral junction region.     COM 3 weeks. 2. Leg elevation and compression stockings. 3. Follow up with me in 2 weeks  4. We noted that PET scan is pending  5. No benefit to steroids during admission was noted  6. Await tagged WBC scan    Reviewed with ID team at length today.       Tico

## 2020-05-22 NOTE — DISCHARGE SUMMARY
General Medicine Discharge Summary     Patient ID:  Rosalino Cerna  79year old  4/5/1940    Admit date: 5/14/2020    Discharge date and time:  5/21/20    Attending Physician: No att. providers found NOAC- will FU with heme as outpatient in 2 weeks. At least 3 months of AC.     # pleural base mass  - consider mesothelioma vs other malginancy  - pulm consulted               - plan PET as OP.  Will FU with Pulm.     # Scrotal swelling/pain  # fevers  # pe 1 TABLET BY MOUTH EVERY DAY, Normal, Disp-90 tablet, R-0    ONETOUCH DELICA LANCETS 28X Does not apply Misc  1 each by Does not apply route daily. , Normal, Disp-100 each, R-3    !!  Glucose Blood (ONETOUCH TEST) In Vitro Strip  Use to test blood sugar 2 to

## 2020-06-02 ENCOUNTER — LAB ENCOUNTER (OUTPATIENT)
Dept: LAB | Facility: HOSPITAL | Age: 80
End: 2020-06-02
Attending: INTERNAL MEDICINE
Payer: MEDICARE

## 2020-06-02 DIAGNOSIS — R50.9 FUO (FEVER OF UNKNOWN ORIGIN): ICD-10-CM

## 2020-06-02 DIAGNOSIS — R50.9 FUO (FEVER OF UNKNOWN ORIGIN): Primary | ICD-10-CM

## 2020-06-02 PROCEDURE — 86665 EPSTEIN-BARR CAPSID VCA: CPT

## 2020-06-02 PROCEDURE — 86644 CMV ANTIBODY: CPT

## 2020-06-02 PROCEDURE — 36415 COLL VENOUS BLD VENIPUNCTURE: CPT

## 2020-06-02 PROCEDURE — 86645 CMV ANTIBODY IGM: CPT

## 2020-06-02 PROCEDURE — 86664 EPSTEIN-BARR NUCLEAR ANTIGEN: CPT

## 2020-06-02 PROCEDURE — 85025 COMPLETE CBC W/AUTO DIFF WBC: CPT

## 2020-06-02 PROCEDURE — 86480 TB TEST CELL IMMUN MEASURE: CPT

## 2020-06-02 PROCEDURE — 87040 BLOOD CULTURE FOR BACTERIA: CPT

## 2020-06-02 PROCEDURE — 80053 COMPREHEN METABOLIC PANEL: CPT

## 2020-06-02 PROCEDURE — 87385 HISTOPLASMA CAPSUL AG IA: CPT

## 2020-06-12 PROCEDURE — 88304 TISSUE EXAM BY PATHOLOGIST: CPT

## 2020-10-16 PROBLEM — D50.0 IRON DEFICIENCY ANEMIA DUE TO CHRONIC BLOOD LOSS: Status: ACTIVE | Noted: 2020-10-16

## 2021-08-12 PROBLEM — Z86.718 HISTORY OF DVT (DEEP VEIN THROMBOSIS): Status: ACTIVE | Noted: 2021-08-12

## 2021-08-12 PROBLEM — N18.31 STAGE 3A CHRONIC KIDNEY DISEASE (HCC): Status: ACTIVE | Noted: 2021-08-12

## 2021-08-12 PROBLEM — M35.3 POLYMYALGIA (HCC): Status: ACTIVE | Noted: 2021-08-12

## 2021-11-30 PROBLEM — I12.9 HYPERTENSIVE KIDNEY DISEASE WITH CHRONIC KIDNEY DISEASE: Status: ACTIVE | Noted: 2021-11-30

## 2021-11-30 PROBLEM — I70.0 AORTO-ILIAC ATHEROSCLEROSIS (HCC): Status: ACTIVE | Noted: 2021-11-30

## 2021-11-30 PROBLEM — I70.8 AORTO-ILIAC ATHEROSCLEROSIS (HCC): Status: ACTIVE | Noted: 2021-11-30

## 2021-12-01 PROBLEM — M35.3 POLYMYALGIA (HCC): Status: RESOLVED | Noted: 2021-08-12 | Resolved: 2021-12-01

## 2021-12-01 PROBLEM — I70.8 AORTO-ILIAC ATHEROSCLEROSIS (HCC): Status: RESOLVED | Noted: 2021-11-30 | Resolved: 2021-12-01

## 2021-12-01 PROBLEM — I70.0 AORTO-ILIAC ATHEROSCLEROSIS (HCC): Status: RESOLVED | Noted: 2021-11-30 | Resolved: 2021-12-01

## (undated) DEVICE — MEDI-VAC NON-CONDUCTIVE SUCTION TUBING: Brand: CARDINAL HEALTH

## (undated) DEVICE — FORCEP RADIAL JAW 4

## (undated) DEVICE — MEDI-VAC SUCTION HANDLE REGULAR CAPACITY: Brand: CARDINAL HEALTH

## (undated) DEVICE — FILTERLINE NASAL ADULT O2/CO2

## (undated) DEVICE — 1200CC GUARDIAN II: Brand: GUARDIAN

## (undated) DEVICE — Device: Brand: DEFENDO AIR/WATER/SUCTION AND BIOPSY VALVE

## (undated) DEVICE — ENDOSCOPY PACK - LOWER: Brand: MEDLINE INDUSTRIES, INC.

## (undated) NOTE — MR AVS SNAPSHOT
[facility],[address], [address] [place] [address] Rd  [phone]               Thank you for choosing us for your health care visit with Shy Coulter RN,CDE.   We are glad to serve you and happy to provide you with training will begin this appointment. Educational needs will be discussed at that time. STEP ONE SATISFIER This is a 1-hour appointment taught by a registered dietitian and diabetes educator.  Half of this appointment time is dedicated to the development o Inject 5 Units into the skin 3 (three) times daily before meals. Commonly known as:  NOVOLOG           LANTUS SOLOSTAR 100 UNIT/ML Sopn   Generic drug:  Insulin Glargine   Inject 20 Units into the skin daily.            LIPITOR 10 MG Tabs   Generic drug: walking, light jogging, cycling, swimming, etc.) for a goal of at least 150 minutes per week. Moderation of alcohol consumption Men: limit to <= 2 drinks* per day. Women and lighter weight persons: limit to <= 1 drink* per day.               Visit EDWARD

## (undated) NOTE — ED AVS SNAPSHOT
Ashley Pantoja   MRN: [id number]    Department:  [facility] Emergency Department   Date of Visit:  12/23/2019           Disclosure     Insurance plans vary and the physician(s) referred by the ER may not be covered by your plan.  Please contact tell this physician (or your personal doctor if your instructions are to return to your personal doctor) about any new or lasting problems. The primary care or specialist physician will see patients referred from the [facility] Emergency Department.  Shelton Lombard

## (undated) NOTE — ED AVS SNAPSHOT
[facility] Emergency Department    [place] [address]    Phone:  [phone]    Fax:  [address]   MRN: [id number]    Department:  [facility] Emergency Department   Date of Visit:  6 levofloxacin 500 MG Tabs   Quantity:  10 tablet   Commonly known as:  LEVAQUIN   Take 1 tablet (500 mg total) by mouth daily.        metRONIDAZOLE 500 MG Tabs   Quantity:  30 tablet   Commonly known as:  FLAGYL   Take 1 tablet (500 mg total) by mouth 3 (th at [phone]    Si usted tiene algun problema con bess sequimiento, por favor llame a nuestro adminstrador de casos al [phone]    Expect to receive an electronic request (by e-mail or text) to complete a self-assessment the day after your visit. [facility] [address] ([address]aret [address]le) [address] ([facility]) [address] [facility] [address]  ([address]) [address] [facility] abscess formation or free air. Minor 2 mm nonobstructing left intrarenal calculus. Bilateral lower zone dependent fibrotic scars with calcified pleural plaque involving the diaphragmatic pleura. Correlate clinically for asbestos related disease. is minimal fluid in the left paracolic gutter within the left lower quadrant consistent with localized peritonitis. Noted is a 2.9 cm diverticulum arising from the distal third portion of the duodenum.  There is a duodenal diverticulum measuring 2.1 cm   ar

## (undated) NOTE — MR AVS SNAPSHOT
[facility] [address], [address] [place] [address] Rd  [phone]               Thank you for choosing us for your health care visit with Mandie Acevedo, RN,CDE.   We are glad to serve you and happy to provide you with training will begin this appointment. Educational needs will be discussed at that time. STEP ONE SATISFIER This is a 1-hour appointment taught by a registered dietitian and diabetes educator.  Half of this appointment time is dedicated to the development o Commonly known as:  GLUCOTROL           glyBURIDE 5 MG Tabs   1 TABLET DAILY   Commonly known as:  DIABETA           HYDROcodone-acetaminophen 5-325 MG Tabs   Take 1-2 tablets by mouth every 4 (four) hours as needed for Pain.    Commonly known as:  Hitesh Cordero

## (undated) NOTE — LETTER
[facility]  Catalino Dias 62 [id number] [facility], [address] [place] [address]    Consent for Operation    Date: __________________    Time: _______________    1.  I authorize the performance upon Particia Shen the following operation:    Procedure(s):    Colonoscopy wit revealed by the pictures or by descriptive texts accompanying them. If the procedure has been videotaped, the surgeon will obtain the original videotape. The hospital will not be responsible for storage or maintenance of this tape.     6. For the purpose of THAT MY DOCTOR PROVIDED ME WITH THE ABOVE EXPLANATIONS, THAT ALL BLANKS OR STATEMENTS REQUIRING INSERTION OR COMPLETION WERE FILLED IN.     Signature of Patient:   ___________________________    When the patient is a minor or mentally incompetent to give co · All of the medicines I take (including prescriptions, herbal supplements, and pills I can buy without a prescription (including street drugs/illegal medications).  Failure to inform my anesthesiologist about these medicines may increase my risk of anesthe _____________________________________________________________________________  Anesthesiologist Signature     Date   Time  I have discussed the procedure and information above with the patient (or patient’s representative) and answered their questions.  The

## (undated) NOTE — ED AVS SNAPSHOT
[facility] Emergency Department    [place] [address]    Phone:  [phone]    Fax:  Demetri Renetta Spaulding   MRN: [id number]    Department:  [facility] Emergency Department   Date of Visit:  2 Meclizine 25 mg every 6-8 hours as needed for dizziness  Follow-up with your primary care physician  Return to the emergency department for any worsening symptoms or new concern    Discharge References/Attachments     VERTIGO, UNSPECIFIED (ENGLISH)      Mingo Metz IF THERE IS ANY CHANGE OR WORSENING OF YOUR CONDITION, CALL YOUR PRIMARY CARE PHYSICIAN AT ONCE OR RETURN IMMEDIATELY TO THE EMERGENCY DEPARTMENT.     If you have been prescribed any medication(s), please fill your prescription right away and begin taking t Patient [address] to help you get signed up for insurance coverage. [address] is a Federal Navigator program that can help with your Affordable Care Act coverage, as well as all types of Medicaid plans.   To get signed up and covere information, go to [email or web address] and click on the Sign Up Now link in the [employer] box. Enter your [employer] Activation Code exactly as it appears below along with your Zip Code and Date of Birth to complete the sign-up process.  If you do

## (undated) NOTE — ED AVS SNAPSHOT
[facility] Emergency Department    [place] [address]    Phone:  [phone]    Fax:  [address] Spaul[address]   MRN: [id number]    Department:  [facility] Emergency Department   Date of Visit:  6 IF THERE IS ANY CHANGE OR WORSENING OF YOUR CONDITION, CALL YOUR PRIMARY CARE PHYSICIAN AT ONCE OR RETURN IMMEDIATELY TO THE EMERGENCY DEPARTMENT.     If you have been prescribed any medication(s), please fill your prescription right away and begin taking t

## (undated) NOTE — ED AVS SNAPSHOT
[facility] Emergency Department    [facility]  [address] [place] [address]    Phone:  [phone]    Fax:  [address]   MRN: [id number]    Department:  [facility] Emergency Department   Date of Visit:  2 IF THERE IS ANY CHANGE OR WORSENING OF YOUR CONDITION, CALL YOUR PRIMARY CARE PHYSICIAN AT ONCE OR RETURN IMMEDIATELY TO THE EMERGENCY DEPARTMENT.     If you have been prescribed any medication(s), please fill your prescription right away and begin taking t